# Patient Record
Sex: MALE | Race: WHITE | NOT HISPANIC OR LATINO | Employment: PART TIME | URBAN - METROPOLITAN AREA
[De-identification: names, ages, dates, MRNs, and addresses within clinical notes are randomized per-mention and may not be internally consistent; named-entity substitution may affect disease eponyms.]

---

## 2020-11-20 ENCOUNTER — APPOINTMENT (OUTPATIENT)
Dept: CT IMAGING | Facility: CLINIC | Age: 60
DRG: 247 | End: 2020-11-20
Attending: EMERGENCY MEDICINE

## 2020-11-20 ENCOUNTER — HOSPITAL ENCOUNTER (INPATIENT)
Facility: CLINIC | Age: 60
LOS: 2 days | Discharge: HOME | DRG: 247 | End: 2020-11-22
Attending: EMERGENCY MEDICINE | Admitting: INTERNAL MEDICINE

## 2020-11-20 DIAGNOSIS — I21.4 NSTEMI (NON-ST ELEVATED MYOCARDIAL INFARCTION) (H): ICD-10-CM

## 2020-11-20 LAB
ANION GAP SERPL CALCULATED.3IONS-SCNC: 4 MMOL/L (ref 3–14)
APTT PPP: 29 SEC (ref 22–37)
BASOPHILS # BLD AUTO: 0 10E9/L (ref 0–0.2)
BASOPHILS NFR BLD AUTO: 0.1 %
BUN SERPL-MCNC: 14 MG/DL (ref 7–30)
CALCIUM SERPL-MCNC: 8.6 MG/DL (ref 8.5–10.1)
CHLORIDE SERPL-SCNC: 105 MMOL/L (ref 94–109)
CO2 SERPL-SCNC: 27 MMOL/L (ref 20–32)
CREAT SERPL-MCNC: 1.2 MG/DL (ref 0.66–1.25)
CRP SERPL-MCNC: 81.7 MG/L (ref 0–8)
D DIMER PPP FEU-MCNC: 0.7 UG/ML FEU (ref 0–0.5)
DIFFERENTIAL METHOD BLD: ABNORMAL
EOSINOPHIL # BLD AUTO: 0.1 10E9/L (ref 0–0.7)
EOSINOPHIL NFR BLD AUTO: 0.4 %
ERYTHROCYTE [DISTWIDTH] IN BLOOD BY AUTOMATED COUNT: 13.9 % (ref 10–15)
GFR SERPL CREATININE-BSD FRML MDRD: 64 ML/MIN/{1.73_M2}
GLUCOSE SERPL-MCNC: 99 MG/DL (ref 70–99)
HBA1C MFR BLD: 6.2 % (ref 0–5.6)
HCT VFR BLD AUTO: 49.5 % (ref 40–53)
HGB BLD-MCNC: 17.1 G/DL (ref 13.3–17.7)
IMM GRANULOCYTES # BLD: 0 10E9/L (ref 0–0.4)
IMM GRANULOCYTES NFR BLD: 0.3 %
INR PPP: 0.96 (ref 0.86–1.14)
INTERPRETATION ECG - MUSE: NORMAL
INTERPRETATION ECG - MUSE: NORMAL
LYMPHOCYTES # BLD AUTO: 1.8 10E9/L (ref 0.8–5.3)
LYMPHOCYTES NFR BLD AUTO: 13.4 %
MCH RBC QN AUTO: 31.3 PG (ref 26.5–33)
MCHC RBC AUTO-ENTMCNC: 34.5 G/DL (ref 31.5–36.5)
MCV RBC AUTO: 91 FL (ref 78–100)
MONOCYTES # BLD AUTO: 1.6 10E9/L (ref 0–1.3)
MONOCYTES NFR BLD AUTO: 11.9 %
NEUTROPHILS # BLD AUTO: 9.9 10E9/L (ref 1.6–8.3)
NEUTROPHILS NFR BLD AUTO: 73.9 %
NRBC # BLD AUTO: 0 10*3/UL
NRBC BLD AUTO-RTO: 0 /100
NT-PROBNP SERPL-MCNC: 2175 PG/ML (ref 0–900)
PLATELET # BLD AUTO: 224 10E9/L (ref 150–450)
POTASSIUM SERPL-SCNC: 3.8 MMOL/L (ref 3.4–5.3)
RBC # BLD AUTO: 5.46 10E12/L (ref 4.4–5.9)
SARS-COV-2 RNA SPEC QL NAA+PROBE: NORMAL
SODIUM SERPL-SCNC: 136 MMOL/L (ref 133–144)
SPECIMEN SOURCE: NORMAL
TROPONIN I SERPL-MCNC: 29.68 UG/L (ref 0–0.04)
WBC # BLD AUTO: 13.4 10E9/L (ref 4–11)

## 2020-11-20 PROCEDURE — 96366 THER/PROPH/DIAG IV INF ADDON: CPT

## 2020-11-20 PROCEDURE — 99223 1ST HOSP IP/OBS HIGH 75: CPT | Mod: AI | Performed by: INTERNAL MEDICINE

## 2020-11-20 PROCEDURE — 99285 EMERGENCY DEPT VISIT HI MDM: CPT | Mod: 25

## 2020-11-20 PROCEDURE — 210N000002 HC R&B HEART CARE

## 2020-11-20 PROCEDURE — 86140 C-REACTIVE PROTEIN: CPT | Performed by: EMERGENCY MEDICINE

## 2020-11-20 PROCEDURE — 250N000013 HC RX MED GY IP 250 OP 250 PS 637: Performed by: EMERGENCY MEDICINE

## 2020-11-20 PROCEDURE — 83880 ASSAY OF NATRIURETIC PEPTIDE: CPT | Performed by: EMERGENCY MEDICINE

## 2020-11-20 PROCEDURE — 93005 ELECTROCARDIOGRAM TRACING: CPT

## 2020-11-20 PROCEDURE — 250N000011 HC RX IP 250 OP 636: Performed by: EMERGENCY MEDICINE

## 2020-11-20 PROCEDURE — C9803 HOPD COVID-19 SPEC COLLECT: HCPCS

## 2020-11-20 PROCEDURE — 96365 THER/PROPH/DIAG IV INF INIT: CPT | Mod: 59

## 2020-11-20 PROCEDURE — 96368 THER/DIAG CONCURRENT INF: CPT

## 2020-11-20 PROCEDURE — 84484 ASSAY OF TROPONIN QUANT: CPT | Performed by: EMERGENCY MEDICINE

## 2020-11-20 PROCEDURE — 93005 ELECTROCARDIOGRAM TRACING: CPT | Mod: 76

## 2020-11-20 PROCEDURE — 85610 PROTHROMBIN TIME: CPT | Performed by: EMERGENCY MEDICINE

## 2020-11-20 PROCEDURE — 85025 COMPLETE CBC W/AUTO DIFF WBC: CPT | Performed by: EMERGENCY MEDICINE

## 2020-11-20 PROCEDURE — 96375 TX/PRO/DX INJ NEW DRUG ADDON: CPT

## 2020-11-20 PROCEDURE — 85730 THROMBOPLASTIN TIME PARTIAL: CPT | Performed by: EMERGENCY MEDICINE

## 2020-11-20 PROCEDURE — 250N000009 HC RX 250: Performed by: EMERGENCY MEDICINE

## 2020-11-20 PROCEDURE — 83036 HEMOGLOBIN GLYCOSYLATED A1C: CPT | Performed by: EMERGENCY MEDICINE

## 2020-11-20 PROCEDURE — U0003 INFECTIOUS AGENT DETECTION BY NUCLEIC ACID (DNA OR RNA); SEVERE ACUTE RESPIRATORY SYNDROME CORONAVIRUS 2 (SARS-COV-2) (CORONAVIRUS DISEASE [COVID-19]), AMPLIFIED PROBE TECHNIQUE, MAKING USE OF HIGH THROUGHPUT TECHNOLOGIES AS DESCRIBED BY CMS-2020-01-R: HCPCS | Performed by: EMERGENCY MEDICINE

## 2020-11-20 PROCEDURE — 85379 FIBRIN DEGRADATION QUANT: CPT | Performed by: EMERGENCY MEDICINE

## 2020-11-20 PROCEDURE — 80048 BASIC METABOLIC PNL TOTAL CA: CPT | Performed by: EMERGENCY MEDICINE

## 2020-11-20 PROCEDURE — 71275 CT ANGIOGRAPHY CHEST: CPT

## 2020-11-20 RX ORDER — NITROGLYCERIN 20 MG/100ML
10-200 INJECTION INTRAVENOUS CONTINUOUS
Status: DISCONTINUED | OUTPATIENT
Start: 2020-11-21 | End: 2020-11-22

## 2020-11-20 RX ORDER — ACETAMINOPHEN 650 MG/1
650 SUPPOSITORY RECTAL EVERY 4 HOURS PRN
Status: DISCONTINUED | OUTPATIENT
Start: 2020-11-20 | End: 2020-11-22 | Stop reason: HOSPADM

## 2020-11-20 RX ORDER — ONDANSETRON 2 MG/ML
4 INJECTION INTRAMUSCULAR; INTRAVENOUS EVERY 6 HOURS PRN
Status: DISCONTINUED | OUTPATIENT
Start: 2020-11-20 | End: 2020-11-22 | Stop reason: HOSPADM

## 2020-11-20 RX ORDER — HYDRALAZINE HYDROCHLORIDE 20 MG/ML
10 INJECTION INTRAMUSCULAR; INTRAVENOUS EVERY 4 HOURS PRN
Status: DISCONTINUED | OUTPATIENT
Start: 2020-11-20 | End: 2020-11-22 | Stop reason: HOSPADM

## 2020-11-20 RX ORDER — ONDANSETRON 4 MG/1
4 TABLET, ORALLY DISINTEGRATING ORAL EVERY 6 HOURS PRN
Status: DISCONTINUED | OUTPATIENT
Start: 2020-11-20 | End: 2020-11-22 | Stop reason: HOSPADM

## 2020-11-20 RX ORDER — LABETALOL HYDROCHLORIDE 5 MG/ML
20 INJECTION, SOLUTION INTRAVENOUS ONCE
Status: COMPLETED | OUTPATIENT
Start: 2020-11-20 | End: 2020-11-20

## 2020-11-20 RX ORDER — CARVEDILOL 6.25 MG/1
6.25 TABLET ORAL 2 TIMES DAILY
Status: DISCONTINUED | OUTPATIENT
Start: 2020-11-21 | End: 2020-11-22

## 2020-11-20 RX ORDER — POLYETHYLENE GLYCOL 3350 17 G/17G
17 POWDER, FOR SOLUTION ORAL DAILY
Status: DISCONTINUED | OUTPATIENT
Start: 2020-11-21 | End: 2020-11-22 | Stop reason: HOSPADM

## 2020-11-20 RX ORDER — MORPHINE SULFATE 2 MG/ML
2 INJECTION, SOLUTION INTRAMUSCULAR; INTRAVENOUS
Status: DISCONTINUED | OUTPATIENT
Start: 2020-11-20 | End: 2020-11-22 | Stop reason: HOSPADM

## 2020-11-20 RX ORDER — ASPIRIN 81 MG/1
324 TABLET, CHEWABLE ORAL ONCE
Status: COMPLETED | OUTPATIENT
Start: 2020-11-20 | End: 2020-11-20

## 2020-11-20 RX ORDER — HEPARIN SODIUM 10000 [USP'U]/100ML
0-5000 INJECTION, SOLUTION INTRAVENOUS CONTINUOUS
Status: DISCONTINUED | OUTPATIENT
Start: 2020-11-20 | End: 2020-11-21

## 2020-11-20 RX ORDER — IOPAMIDOL 755 MG/ML
80 INJECTION, SOLUTION INTRAVASCULAR ONCE
Status: COMPLETED | OUTPATIENT
Start: 2020-11-20 | End: 2020-11-20

## 2020-11-20 RX ORDER — ATORVASTATIN CALCIUM 40 MG/1
40 TABLET, FILM COATED ORAL DAILY
Status: DISCONTINUED | OUTPATIENT
Start: 2020-11-21 | End: 2020-11-22 | Stop reason: HOSPADM

## 2020-11-20 RX ORDER — AMOXICILLIN 250 MG
2 CAPSULE ORAL 2 TIMES DAILY PRN
Status: DISCONTINUED | OUTPATIENT
Start: 2020-11-20 | End: 2020-11-22 | Stop reason: HOSPADM

## 2020-11-20 RX ORDER — LIDOCAINE 40 MG/G
CREAM TOPICAL
Status: DISCONTINUED | OUTPATIENT
Start: 2020-11-20 | End: 2020-11-22 | Stop reason: HOSPADM

## 2020-11-20 RX ORDER — POLYETHYLENE GLYCOL 3350 17 G/17G
17 POWDER, FOR SOLUTION ORAL DAILY PRN
Status: DISCONTINUED | OUTPATIENT
Start: 2020-11-20 | End: 2020-11-22 | Stop reason: HOSPADM

## 2020-11-20 RX ORDER — LISINOPRIL 10 MG/1
10 TABLET ORAL DAILY
Status: DISCONTINUED | OUTPATIENT
Start: 2020-11-21 | End: 2020-11-22 | Stop reason: HOSPADM

## 2020-11-20 RX ORDER — AMOXICILLIN 250 MG
2 CAPSULE ORAL 2 TIMES DAILY
Status: DISCONTINUED | OUTPATIENT
Start: 2020-11-21 | End: 2020-11-22 | Stop reason: HOSPADM

## 2020-11-20 RX ORDER — ACETAMINOPHEN 325 MG/1
650 TABLET ORAL EVERY 4 HOURS PRN
Status: DISCONTINUED | OUTPATIENT
Start: 2020-11-20 | End: 2020-11-22 | Stop reason: HOSPADM

## 2020-11-20 RX ORDER — AMOXICILLIN 250 MG
1 CAPSULE ORAL 2 TIMES DAILY
Status: DISCONTINUED | OUTPATIENT
Start: 2020-11-21 | End: 2020-11-22 | Stop reason: HOSPADM

## 2020-11-20 RX ORDER — HEPARIN SODIUM 10000 [USP'U]/100ML
0-5000 INJECTION, SOLUTION INTRAVENOUS CONTINUOUS
Status: DISCONTINUED | OUTPATIENT
Start: 2020-11-21 | End: 2020-11-22

## 2020-11-20 RX ORDER — NITROGLYCERIN 20 MG/100ML
10-200 INJECTION INTRAVENOUS CONTINUOUS
Status: DISCONTINUED | OUTPATIENT
Start: 2020-11-20 | End: 2020-11-21

## 2020-11-20 RX ORDER — AMOXICILLIN 250 MG
1 CAPSULE ORAL 2 TIMES DAILY PRN
Status: DISCONTINUED | OUTPATIENT
Start: 2020-11-20 | End: 2020-11-22 | Stop reason: HOSPADM

## 2020-11-20 RX ORDER — BISACODYL 10 MG
10 SUPPOSITORY, RECTAL RECTAL DAILY PRN
Status: DISCONTINUED | OUTPATIENT
Start: 2020-11-20 | End: 2020-11-22 | Stop reason: HOSPADM

## 2020-11-20 RX ORDER — ASPIRIN 81 MG/1
81 TABLET, CHEWABLE ORAL DAILY
Status: DISCONTINUED | OUTPATIENT
Start: 2020-11-21 | End: 2020-11-22

## 2020-11-20 RX ADMIN — LABETALOL HYDROCHLORIDE 20 MG: 5 INJECTION, SOLUTION INTRAVENOUS at 18:59

## 2020-11-20 RX ADMIN — SODIUM CHLORIDE 100 ML: 9 INJECTION, SOLUTION INTRAVENOUS at 19:24

## 2020-11-20 RX ADMIN — NITROGLYCERIN 10 MCG/MIN: 20 INJECTION INTRAVENOUS at 19:37

## 2020-11-20 RX ADMIN — HEPARIN SODIUM 1200 UNITS/HR: 10000 INJECTION, SOLUTION INTRAVENOUS at 19:37

## 2020-11-20 RX ADMIN — ASPIRIN 324 MG: 81 TABLET, CHEWABLE ORAL at 19:36

## 2020-11-20 RX ADMIN — IOPAMIDOL 80 ML: 755 INJECTION, SOLUTION INTRAVENOUS at 19:24

## 2020-11-20 RX ADMIN — TICAGRELOR 180 MG: 90 TABLET ORAL at 22:23

## 2020-11-20 ASSESSMENT — ENCOUNTER SYMPTOMS
FEVER: 0
CHEST TIGHTNESS: 1
HEADACHES: 0
DIZZINESS: 0
SHORTNESS OF BREATH: 1
COUGH: 1

## 2020-11-20 ASSESSMENT — MIFFLIN-ST. JEOR: SCORE: 2001.48

## 2020-11-20 NOTE — Clinical Note
Stent deployed in the distal right coronary artery. Max pressure = 8 ronan. Total duration = 20 seconds. Balloon reinflated a second time: Max pressure = 12 ronan. Total duration = 22 seconds.

## 2020-11-20 NOTE — Clinical Note
The first balloon was inserted into the right coronary artery and RPLB.Max pressure = 4 ronan. Total duration = 15 seconds.     Max pressure = 4 ronan. Total duration = 15 seconds.    Balloon reinflated a second time: Max pressure = 4 ronan. Total duration = 15 seconds.  Balloon reinflated a third time: Max pressure = 6 ronan. Total duration = 40 seconds.

## 2020-11-20 NOTE — Clinical Note
The first balloon was inserted into the right coronary artery and distal right coronary artery.Max pressure = 6 ronan. Total duration = 30 seconds.     Max pressure = 4 ronan. Total duration = 20 seconds.    Balloon reinflated a second time: Max pressure = 4 ronan. Total duration = 20 seconds.  Balloon reinflated a third time: Max pressure = 6 ronan. Total duration = 25 seconds.  Balloon reinflated a fourth time: Max pressure = 6 ronan. Total duration = 20 seconds.

## 2020-11-20 NOTE — ED PROVIDER NOTES
History     Chief Complaint:  Chest Tightness and Shortness of Breath     HPI   Sancho Hardin is a 60 year old male with a history of uncontrolled hypertension (not on meds) who presents for evaluation of chest tightness and shortness of breath. The patient reports that approximately four days ago he started to develop new constant chest heaviness and tightness that seems to be independent of position or activity. He denies chest pain and notes that his bilateral hands and ankles have also been abnormally swollen since then (his ankles have intermittently swelled in the past). He reports that his blood pressure is usually in the 160/120s range, but today it was much higher prompting him to call his clinic who advised him to seek evaluation in the ED. He also reports a somewhat productive cough and shortness of breath that is worse with activity and when bending over, although he notes that he has had both of these symptoms for a long time and they have not worsened recently. Otherwise, he denies any recent fever, headache, or dizziness.      Allergies:  Oxycodone-acetaminophen   Vicodin      Medications:    Lisinopril-hydrochlorothiazide (not taking for several years)     Past Medical History:    Hypertension   Hyperlipidemia   Migraines     Past Surgical History:    Irrigation and debridement, excisional biopsy of infected right long finger   Right hand third digit pin placement   Hemorrhoidectomy  Tonsillectomy and adenoidectomy     Family History:    Macular degeneration - Mother   Astigmatism - Sister      Social History:  Tobacco use:    Current every day smoker   Alcohol use:    Negative   Drug use:    Negative   Marital status:          Review of Systems   Constitutional: Negative for fever.   Respiratory: Positive for cough, chest tightness and shortness of breath.    Cardiovascular: Positive for leg swelling (bilateral ankles). Negative for chest pain.   Musculoskeletal:        (+) Bilateral  "hand swelling    Neurological: Negative for dizziness and headaches.   All other systems reviewed and are negative.      Physical Exam     Patient Vitals for the past 24 hrs:   BP Temp Temp src Pulse Resp SpO2 Height Weight   11/20/20 2014 (!) 142/96 -- -- 85 18 94 % -- --   11/20/20 2000 (!) 146/99 -- -- 82 20 93 % -- --   11/20/20 1945 (!) 150/120 -- -- 89 14 94 % -- --   11/20/20 1930 (!) 151/113 -- -- 81 29 96 % -- --   11/20/20 1915 (!) 167/123 -- -- 79 29 95 % -- --   11/20/20 1900 (!) 183/117 -- -- 93 20 95 % -- --   11/20/20 1733 (!) 210/140 97.7  F (36.5  C) Temporal 98 24 97 % 1.803 m (5' 11\") 117.9 kg (260 lb)      Physical Exam  General/Appearance: appears stated age, well-groomed, appears comfortable but slightly winded when talking, elevated BMI  Eyes: EOMI, no scleral injection, no icterus  ENT: MMM  Neck: supple, nl ROM, no stiffness  Cardiovascular: RRR, nl S1S2, no m/r/g, 2+ pulses in all 4 extremities, cap refill <2sec  Respiratory: CTAB, good air movement throughout, no wheezes/rhonchi/rales, slight tachypnea, no retractions  Back: no lesions  GI: abd soft and obese, non-distended, nttp,  no HSM, no rebound, no guarding, nl BS  MSK: PORTILLO, good tone, no bony abnormality  Skin: warm and well-perfused, no rash, non-pitting edema to bilateral ankles and shins, no ecchymosis, nl turgor  Neuro: GCS 15, alert and oriented, no gross focal neuro deficits  Psych: interacts appropriately  Heme: no petechia, no purpura, no active bleeding    Emergency Department Course   ECG (17:34:19):  Indication: Screening for cardiovascular disease.   Rate 96 bpm. NM interval 152 ms. QRS duration 104 ms. QT/QTc 376/475 ms. P-R-T axes 40 -14 128.   Interpretation: Normal sinus rhythm, Nonspecific ST and T wave abnormality, Prolonged QT, Abnormal ECG   Agree with computer interpretation. Yes   No significant change compared to EKG dated 3/13/2014.   Interpreted at 1740 by Dr. Rai.      ECG (19:13:09):  Indication: " Screening for cardiovascular disease.   Rate 79 bpm. MD interval 160 ms. QRS duration 106 ms. QT/QTc 418/479 ms. P-R-T axes 29 -7 170.   Interpretation: Normal sinus rhythm, Inferior infarct age undetermined, ST & T wave abnormality consider lateral ischemia, Abnormal ECG   Agree with computer interpretation. Yes.   Interpreted at 1920 by Dr. Rai.        Imaging:  Radiographic findings were communicated with the patient who voiced understanding of the findings.    CT Chest Pulmonary Embolism w Contrast:  IMPRESSION:   1. No evidence of pulmonary embolism.   2. The main pulmonary artery is enlarged measuring 3.1 cm, this can be seen with pulmonary hypertension. Multiple enlarged mediastinal and hilar lymph nodes, increased as compared to 3/13/2014 exam, indeterminate, could be reactive.   3. Scattered pulmonary nodules including 10 mm right lower lobe nodule, increased in size as compared to 3/13/2014 where it measured 7 mm, although indeterminant, but less likely neoplastic given its very slow interval growth. Recommend follow-up exam in three months to ensure stability.   4. Mild cardiomegaly.   Per radiology.      Laboratory:  CBC: WBC 13.4 high, o/w WNL (HGB 17.1, )   BMP: WNL (Creatinine 1.20)    Troponin I (1816): 29.680 high   D dimer quantitative: 0.7 high   Nt probnp inpatient: 2,175 high   INR: 0.96   Partial thromboplastin time: 29   Symptomatic COVID-19 Virus by PCR: Pending      Interventions:  1859 Labetalol 20 mg IV   1936 Aspirin 324 mg PO   1937 Heparin 1,200 units/hr IV   1937 Nitroglycerin 10 mcg/min IV   1941 Heparin 6,000 units IV   2005 Nitroglycerin 50 mcg/min IV rate/dose change    Brilinta 180 mg IV     Emergency Department Course:  Nursing notes and vitals reviewed.  1755: I performed an exam of the patient as documented above.      1912: I updated and reassessed the patient. He reported ongoing constant chest pressure.     1936: I spoke with Dr. Ortiz of the cardiology service  regarding patient's presentation, findings, and plan of care.     1949: I spoke with Dr. Ortiz of the cardiology service regarding patient's presentation, findings, and plan of care.     1958: I spoke with Dr. Carrillo of the interventional cardiology service regarding patient's presentation, findings, and plan of care.     2003: I updated and reassessed the patient. He has not had any change in symptoms.      2040: I updated and reassessed the patient. He reports that he still feels short of breath but his chest pressure is gone.      2050: I spoke with Dr. Carrillo of the interventional cardiology service regarding patient's presentation, findings, and plan of care. He wants to hold off on the cath lab for now but start him on 180 mg of Bilinta and he will see the patient in the morning.      2121: I spoke with Dr. Recio of the hospitalist service regarding patient's presentation, findings, and plan of care.      Findings and plan explained to the Patient who consents to admission. Discussed the patient with Dr. Recio, who will admit the patient to a CCU bed for further monitoring, evaluation, and treatment.      Impression & Plan       Medical Decision Making:  This patient is a 60-year-old male with history of uncontrolled hypertension, not on medication, who presents with 4 days of chest tightness and shortness of breath.  He also notes edema to all 4 extremities.  Differential for him was broad and included ACS, PE, infectious source, heart failure exacerbation.  Ultimately  D-dimer did come back is slightly elevated so he was sent for CT which was relatively negative.  Most importantly an impressively his troponin came back is markedly elevated near 30.  His initial EKG was concerning with some nonspecific ST-T wave changes however serial EKGs showed some slight progression and elevation in the inferior leads and some progressive T wave inversions in the lateral leads.  It did not meet STEMI criteria but  definitely was a concerning pattern.  Because of this I spoke with both the on-call cardiologist and then ultimately the interventional cardiologist.  They agreed with the beta-blocker, labetalol, that have been given as well as the aspirin, nitro drip, heparin drip.  Per the interventional cardiologist we did wait to see how symptoms responded to the nitro drip.  The shortness of breath did not resolve but the chest heaviness did.  Because of this, as well as the timeframe for how long he had been symptomatic, it was felt that likely an emergent cath would not change his ultimate prognosis.  They requested that I give him Brilinta and that he come in and be seen by cardiology in the morning.  Patient is in agreement with this plan.  Critical Care time:  was 60 minutes for this patient excluding procedures.     Diagnosis:    ICD-10-CM    1. NSTEMI (non-ST elevated myocardial infarction) (H)  I21.4 Symptomatic COVID-19 Virus (Coronavirus) by PCR     INR     INR     Partial thromboplastin time     Partial thromboplastin time     CANCELED: INR     CANCELED: Partial thromboplastin time      Disposition:  Admitted to the CCU.         I, Aayush Cartagena, am serving as a scribe at 5:55 PM on 11/20/2020 to document services personally performed by Dr. Rai, based on my observations and the provider's statements to me.     [facility] EMERGENCY DEPT       Cecelia, Monique Acevedo MD  11/20/20 [id number]

## 2020-11-20 NOTE — Clinical Note
Stent deployed in the middle right coronary artery. Max pressure = 11 ronan. Total duration = 15 seconds.

## 2020-11-20 NOTE — Clinical Note
The first balloon was inserted into the right coronary artery and RPDA.Max pressure = 7 ronan. Total duration = 15 seconds.     Max pressure = 7 ronan. Total duration = 15 seconds.    Balloon reinflated a second time: Max pressure = 7 ronan. Total duration = 15 seconds.  Balloon reinflated a third time: Max pressure = 12 ronan. Total duration = 20 seconds.  Balloon reinflated a fourth time: Max pressure = 12 ronan. Total duration = 20 seconds.

## 2020-11-20 NOTE — Clinical Note
The first balloon was inserted into the right coronary artery and middle right coronary artery.Max pressure = 12 ronan. Total duration = 20 seconds.     Max pressure = 14 ronan. Total duration = 25 seconds.    Balloon reinflated a second time: Max pressure = 14 ronan. Total duration = 25 seconds.

## 2020-11-20 NOTE — ED TRIAGE NOTES
C/o constant bilateral chest pain below both breast radiating around into his back. Sob. Swollen feet yesterday. And a sore tooth X 2 days . States his BP is high.

## 2020-11-21 LAB
ALBUMIN SERPL-MCNC: 3 G/DL (ref 3.4–5)
ALP SERPL-CCNC: 112 U/L (ref 40–150)
ALT SERPL W P-5'-P-CCNC: 35 U/L (ref 0–70)
APTT PPP: 37 SEC (ref 22–37)
AST SERPL W P-5'-P-CCNC: 120 U/L (ref 0–45)
BILIRUB DIRECT SERPL-MCNC: 0.2 MG/DL (ref 0–0.2)
BILIRUB SERPL-MCNC: 1 MG/DL (ref 0.2–1.3)
CHOLEST SERPL-MCNC: 193 MG/DL
ERYTHROCYTE [DISTWIDTH] IN BLOOD BY AUTOMATED COUNT: 13.9 % (ref 10–15)
HCT VFR BLD AUTO: 44.2 % (ref 40–53)
HDLC SERPL-MCNC: 40 MG/DL
HGB BLD-MCNC: 15 G/DL (ref 13.3–17.7)
LABORATORY COMMENT REPORT: NORMAL
LDLC SERPL CALC-MCNC: 131 MG/DL
MCH RBC QN AUTO: 30.5 PG (ref 26.5–33)
MCHC RBC AUTO-ENTMCNC: 33.9 G/DL (ref 31.5–36.5)
MCV RBC AUTO: 90 FL (ref 78–100)
NONHDLC SERPL-MCNC: 153 MG/DL
PLATELET # BLD AUTO: 192 10E9/L (ref 150–450)
POTASSIUM SERPL-SCNC: 3.5 MMOL/L (ref 3.4–5.3)
PROT SERPL-MCNC: 6.5 G/DL (ref 6.8–8.8)
RBC # BLD AUTO: 4.92 10E12/L (ref 4.4–5.9)
SARS-COV-2 RNA SPEC QL NAA+PROBE: NEGATIVE
SPECIMEN SOURCE: NORMAL
TRIGL SERPL-MCNC: 110 MG/DL
TROPONIN I SERPL-MCNC: 26.06 UG/L (ref 0–0.04)
TROPONIN I SERPL-MCNC: 28.1 UG/L (ref 0–0.04)
UFH PPP CHRO-ACNC: 0.27 IU/ML
UFH PPP CHRO-ACNC: <0.1 IU/ML
WBC # BLD AUTO: 12 10E9/L (ref 4–11)

## 2020-11-21 PROCEDURE — 85520 HEPARIN ASSAY: CPT | Performed by: EMERGENCY MEDICINE

## 2020-11-21 PROCEDURE — 36415 COLL VENOUS BLD VENIPUNCTURE: CPT | Performed by: INTERNAL MEDICINE

## 2020-11-21 PROCEDURE — 250N000011 HC RX IP 250 OP 636: Performed by: INTERNAL MEDICINE

## 2020-11-21 PROCEDURE — 84484 ASSAY OF TROPONIN QUANT: CPT | Performed by: INTERNAL MEDICINE

## 2020-11-21 PROCEDURE — 80076 HEPATIC FUNCTION PANEL: CPT | Performed by: INTERNAL MEDICINE

## 2020-11-21 PROCEDURE — 85520 HEPARIN ASSAY: CPT | Performed by: INTERNAL MEDICINE

## 2020-11-21 PROCEDURE — 85027 COMPLETE CBC AUTOMATED: CPT | Performed by: INTERNAL MEDICINE

## 2020-11-21 PROCEDURE — 85730 THROMBOPLASTIN TIME PARTIAL: CPT | Performed by: EMERGENCY MEDICINE

## 2020-11-21 PROCEDURE — 210N000002 HC R&B HEART CARE

## 2020-11-21 PROCEDURE — 93005 ELECTROCARDIOGRAM TRACING: CPT

## 2020-11-21 PROCEDURE — 99221 1ST HOSP IP/OBS SF/LOW 40: CPT | Performed by: INTERNAL MEDICINE

## 2020-11-21 PROCEDURE — 36415 COLL VENOUS BLD VENIPUNCTURE: CPT | Performed by: EMERGENCY MEDICINE

## 2020-11-21 PROCEDURE — 99232 SBSQ HOSP IP/OBS MODERATE 35: CPT | Performed by: INTERNAL MEDICINE

## 2020-11-21 PROCEDURE — 84132 ASSAY OF SERUM POTASSIUM: CPT | Performed by: INTERNAL MEDICINE

## 2020-11-21 PROCEDURE — 250N000013 HC RX MED GY IP 250 OP 250 PS 637: Performed by: INTERNAL MEDICINE

## 2020-11-21 PROCEDURE — 93010 ELECTROCARDIOGRAM REPORT: CPT | Mod: 76 | Performed by: INTERNAL MEDICINE

## 2020-11-21 PROCEDURE — 80061 LIPID PANEL: CPT | Performed by: INTERNAL MEDICINE

## 2020-11-21 RX ORDER — NALOXONE HYDROCHLORIDE 0.4 MG/ML
0.2 INJECTION, SOLUTION INTRAMUSCULAR; INTRAVENOUS; SUBCUTANEOUS
Status: DISCONTINUED | OUTPATIENT
Start: 2020-11-21 | End: 2020-11-22 | Stop reason: HOSPADM

## 2020-11-21 RX ORDER — NALOXONE HYDROCHLORIDE 0.4 MG/ML
0.4 INJECTION, SOLUTION INTRAMUSCULAR; INTRAVENOUS; SUBCUTANEOUS
Status: DISCONTINUED | OUTPATIENT
Start: 2020-11-21 | End: 2020-11-22 | Stop reason: HOSPADM

## 2020-11-21 RX ORDER — MORPHINE SULFATE 4 MG/ML
3 INJECTION, SOLUTION INTRAMUSCULAR; INTRAVENOUS ONCE
Status: COMPLETED | OUTPATIENT
Start: 2020-11-22 | End: 2020-11-21

## 2020-11-21 RX ADMIN — MORPHINE SULFATE 3 MG: 4 INJECTION INTRAVENOUS at 23:39

## 2020-11-21 RX ADMIN — CARVEDILOL 6.25 MG: 6.25 TABLET, FILM COATED ORAL at 22:57

## 2020-11-21 RX ADMIN — CARVEDILOL 6.25 MG: 6.25 TABLET, FILM COATED ORAL at 00:22

## 2020-11-21 RX ADMIN — MORPHINE SULFATE 2 MG: 2 INJECTION, SOLUTION INTRAMUSCULAR; INTRAVENOUS at 00:23

## 2020-11-21 RX ADMIN — TICAGRELOR 90 MG: 90 TABLET ORAL at 22:57

## 2020-11-21 RX ADMIN — HEPARIN SODIUM 1500 UNITS/HR: 10000 INJECTION, SOLUTION INTRAVENOUS at 23:01

## 2020-11-21 RX ADMIN — ATORVASTATIN CALCIUM 40 MG: 40 TABLET, FILM COATED ORAL at 08:43

## 2020-11-21 RX ADMIN — LISINOPRIL 10 MG: 10 TABLET ORAL at 08:43

## 2020-11-21 RX ADMIN — CARVEDILOL 6.25 MG: 6.25 TABLET, FILM COATED ORAL at 08:43

## 2020-11-21 RX ADMIN — MORPHINE SULFATE 2 MG: 2 INJECTION, SOLUTION INTRAMUSCULAR; INTRAVENOUS at 21:52

## 2020-11-21 RX ADMIN — HEPARIN SODIUM 1500 UNITS/HR: 10000 INJECTION, SOLUTION INTRAVENOUS at 05:37

## 2020-11-21 RX ADMIN — ASPIRIN 81 MG: 81 TABLET, CHEWABLE ORAL at 08:43

## 2020-11-21 RX ADMIN — NITROGLYCERIN 70 MCG/MIN: 20 INJECTION INTRAVENOUS at 08:32

## 2020-11-21 RX ADMIN — ACETAMINOPHEN 650 MG: 325 TABLET, FILM COATED ORAL at 08:46

## 2020-11-21 RX ADMIN — TICAGRELOR 90 MG: 90 TABLET ORAL at 08:43

## 2020-11-21 RX ADMIN — NITROGLYCERIN 70 MCG/MIN: 20 INJECTION INTRAVENOUS at 23:08

## 2020-11-21 NOTE — PLAN OF CARE
Neuro: A/O x4  CV/Rhythm: SR  Resp/02: MUNOZ, on 2L NC  GI/Diet: cardiac  : Voids in bathroom with SBA  Skin/Incisions/Sites: some bruising, intact  Pulses/CMS: palpable  Edema: NA  Activity/Falls Risk: SBA  Lines/Drains/IVs: Hep 1500 unit(s)/hr, nitroglycerin  60 (18ml/hr)  Labs/BGM:   Test/Procedures: awaiting angio  VS/Pain: VSS, c/o of headache relieved with tylenol  DC Plan: pending cardiac workup, likely home  Other:  Will keep NPO at midnight for possible angio tomorrow.

## 2020-11-21 NOTE — PLAN OF CARE
Patient alert, SBA. Up to floor still c/o SOB and chest discomfort. Nitroglycerin drip at 50mcg/kg/min, now at 70, morphine and O2 applied at 4l n/c and is pain free and no SOB.Appears to have some sleep apnea at times. Heparin drip at 1500. Trop trended down to 28. BP much improved. NPO for cardiology consult today. Review POC with patient.

## 2020-11-21 NOTE — PROGRESS NOTES
Text paged Dr. Ortiz to see if pt should remain NPO for possible angio today.  Per Dr. Ortiz ok to eat, will make NPO at midnight.

## 2020-11-21 NOTE — PHARMACY-ADMISSION MEDICATION HISTORY
Pharmacy Medication History  Admission medication history interview status for the 11/20/2020  admission is complete. See EPIC admission navigator for prior to admission medications     Medication history sources: Patient  Location of interview: phone  Medication history source reliability: Good  Adherence assessment: n/a    Additional medication history information:   Patient reports no medications on a regular basis.     Medication reconciliation completed by provider prior to medication history? No    Time spent in this activity: 5 minutes     Prior to Admission medications    Not on File     Diane Tipton, PharmD  [phone]

## 2020-11-21 NOTE — H&P
[facility]  History and Physical  Hospitalist       Date of Admission:  11/20/2020    Assessment & Plan   Sancho Hardin is a very pleasant 60 year old male with uncontrolled hypertension, morbid obesity, dyslipidemia, tobacco abuse who was admitted on 11/20/2020 with chest pressure, shortness of breath and significantly elevated troponin with EKG changes consistent with NSTEMI.    NSTEMI  Poorly controlled hypertension  Cardiomegaly   Symptoms of intermittent chest pressure for the past 4 days, along with increased ankle swelling. Not on medication prior to admission for blood pressure which he knows has been elevated. Brilinta and aspirin given in ED.   -admit to inpatient  -serial troponins  -therapeutic heparin infusion  -nitroglycerin infusion  -Brilinta 180 mg x 1 given in ED  -cardiology consultation  -check FLP, LFTs, CRP, hemoglobin A1c  -NPO after midnight in case of angiogram tomorrow  -aspirin 81 mg daily  -brilinta 90 mg daily  -start lisinopril 10 mg daily  -start carvedilol 6.25 mg BID  -start atorvastatin 40 mg daily  -prn hydralazine 10 mg IV q 4 hours as needed for SBP>170, can uptitrate as needed.     Morbid obesity  BMI 36    Incidental finding of scattered pulmonary nodules  These were present in imaging obtained in 2014 and have had minimal increase in size so unlikely malignancy.   -outpatient follow up for repeat imaging in 3 months    Symptomatic Rule Out of COVID-19 infection  This patient was evaluated during a global COVID-19 pandemic, which necessitated consideration that the patient might be at risk for infection with the SARS-CoV-2 virus that causes COVID-19. Applicable protocols for evaluation were followed during the patient's care. Low suspicion for infection given alternate explanation for shortness of breath, chest pressure.   -follow-up COVID-19 PCR test result  -droplet, contact precautions    DVT prophylaxis: therapeutic heparin  Code Status:  "Full     Disposition: Expected discharge in 2 days.    Yara Recio MD  Text Page    ~~~~~~~~~~~~~~~~~~~~~~~~~~~~~~~~~~~~~~~~~~~~~~~~~~~~~  Primary Care Physician   CELIO BENAVIDEZ    Chief Complaint   Chest pressure, shortness of breath, leg swelling    History is obtained from the patient and medical records.    History of Present Illness   Sancho Hardin is a very pleasant 60 year old male with uncontrolled hypertension, dyslipidemia, morbid obesity, tobacco abuse who presents with about 4 days of intermittent chest pressure, central and radiating to bilateral pectoral muscles then to back. He acknowledges having untreated hypertension for \"quite a while\". Along with this he has had shortness of breath, foot and ankle swelling, as well as hand swelling. No immobility or calf tenderness. No pleuritic chest pain. His blood pressure on presentation was 210/140, then was 142/96 after getting labetalol x 1, and initiation of nitroglycerin infusion. No fevers, chills, night sweats, headache, vision changes. Reports less chest pressure sensation after the nitroglycerin was started but shortness of breath persists.  He is a current smoker.  No known family history of major cardiac problems that he can recall.    Case reviewed with Dr. Rai from the Emergency Department. Labs and available imaging reviewed. Pertinent results include: troponin 29, BNP 2,175, BMP normal, d.dimer 0.7. 12 lead SKG with lateral t wave inversions. Chest CT negative for PE but has scattered pulmonary nodules, cardiomegaly. The patient was given aspirin, brilinta, initated on heparin infusion, nitroglycerin infusion, labetalol x 1. Spoke with the cardiologist and interventional cardiologist on call who did not feel the patient would need urgent angiogram tonight and could be seen tomorrow morning.     Past Medical History    I have reviewed this patient's medical history and updated it with pertinent information if needed.   Morbid " obesity  Hypertension  High cholesterol  Tobacco abuse    Past Surgical History   I have reviewed this patient's surgical history and updated it with pertinent information if needed.  Past Surgical History:   Procedure Laterality Date     AMPUTATE FINGER(S)  9/27/2013    Procedure: AMPUTATE FINGER(S);  IRRIGATION AND DEBRIDEMENT,excisional biopsy of infection RIGHT LONG FINGER.;  Surgeon: Demond Woodward MD;  Location:  OR     ORTHOPEDIC SURGERY  09/04/2013    right hand 3rd digit pin placement     Prior to Admission Medications   None     Allergies   Allergies   Allergen Reactions     Oxycodone-Acetaminophen Nausea and Vomiting     Vicodin [Hydrocodone-Acetaminophen]        Social History   I have reviewed this patient's social history and updated it with pertinent information if needed. Sancho Hardin  reports that he has been smoking. He does not have any smokeless tobacco history on file. He reports that he does not drink alcohol or use drugs.    Family History   I have reviewed this patient's family history and updated it with pertinent information if needed. No known family history of major cardiac problems that he can recall.    Review of Systems   The 10 point Review of Systems is negative other than noted in the HPI or here.    Physical Exam   Temp: 97.7  F (36.5  C) Temp src: Temporal BP: (!) 142/96 Pulse: 85   Resp: 18 SpO2: 94 % O2 Device: None (Room air)    Vital Signs with Ranges  Temp:  [97.7  F (36.5  C)] 97.7  F (36.5  C)  Pulse:  [79-98] 85  Resp:  [14-29] 18  BP: (142-210)/() 142/96  SpO2:  [93 %-97 %] 94 %  260 lbs 0 oz    Constitutional: Alert, NAD, pleasant and interactive  Eyes: PERRL, sclerae anicteric  HEENT: mmm, atraumatic  Respiratory: Lungs CTAB, no wheezes or crackles  No chest wall tenderness to palpation  Cardiovascular: RRR, no murmurs  no LE edema  GI: Obese, soft, non-tender, nondistended  Skin/Integument: warm, dry, no acute rashes  Genitourinary: not  examined  Musc: PORTILLO, normal limb strength x 4  Neuro: AOx3, no focal deficits, no tremors  Psych: not anxious, not confused      Data   Data reviewed today:  I personally reviewed: 12 lead SKG with lateral t wave inversions. Chest CT negative for PE but has scattered pulmonary nodules, cardiomegaly.  Recent Labs   Lab 11/20/20  1816   WBC 13.4*   HGB 17.1   MCV 91      INR 0.96      POTASSIUM 3.8   CHLORIDE 105   CO2 27   BUN 14   CR 1.20   ANIONGAP 4   ARMANDO 8.6   GLC 99   TROPI 29.680*       Imaging:  Recent Results (from the past 24 hour(s))   CT Chest Pulmonary Embolism w Contrast    Narrative    CT CHEST PULMONARY EMBOLISM WITH CONTRAST  11/20/2020 7:37 PM    HISTORY: PE suspected, intermediate probability, positive D-dimer.  Chest pain. Evaluate PE.    TECHNIQUE: Scans obtained from the apices through the diaphragm with  IV contrast. 80mL Isovue-370 IV injected. Radiation dose for this scan  was reduced using automated exposure control, adjustment of the mA  and/or kV according to patient size, or iterative reconstruction  technique.    COMPARISON: Chest CT on 3/13/2014.    FINDINGS:  Chest/mediastinum: No evidence of pulmonary embolism. Mild  cardiomegaly. No pericardial effusion. Moderate atherosclerotic  vascular calcification of the coronary arteries and thoracic aorta.  Multiple enlarged mediastinal and hilar lymph nodes, increased as  compared to 3/13/2014 exam, for example, 1.5 cm short axis right  pretracheal lymph node (series 5 image 93) and 1.2 cm short axis right  hilar lymph node (series 5 image 118), indeterminate, reactive. The  main pulmonary artery is enlarged measuring 3.6 cm, this can be seen  with pulmonary hypertension.    Lungs and pleura: No pleural effusion or pneumothorax. Mild bibasilar  atelectasis. Scattered pulmonary nodules including 10 mm right lower  lobe nodule (series 5 image 141), slightly increased as compared to  3/13/2014 exam which measures 7 mm and 6 mm left  lower lobe nodule  (series 5 image 174), not significantly changed as compared to  3/13/2014 exam.    Upper abdomen: Limited evaluation of the upper abdomen due to lack of  coverage and timing of contrast. Reflux of contrast into the hepatic  IVC, suggesting right heart dysfunction.    Bones and soft tissue: Multilevel degenerative changes of the spine.  No suspicious osseous lesion.      Impression    IMPRESSION:   1. No evidence of pulmonary embolism.  2. The main pulmonary artery is enlarged measuring 3.1 cm, this can be  seen with pulmonary hypertension. Multiple enlarged mediastinal and  hilar lymph nodes, increased as compared to 3/13/2014 exam,  indeterminate, could be reactive.  3. Scattered pulmonary nodules including 10 mm right lower lobe  nodule, increased in size as compared to 3/13/2014 where it measured 7  mm, although indeterminant, but less likely neoplastic given its very  slow interval growth. Recommend follow-up exam in three months to  ensure stability.  4. Mild cardiomegaly.    NATHAN WILL MD

## 2020-11-21 NOTE — PROGRESS NOTES
[facility]  Hospitalist Progress Note  Vincenzo Huang MD  11/21/2020    Assessment & Plan   Sancho Hardin is a very pleasant 60 year old male with uncontrolled hypertension, morbid obesity, dyslipidemia, tobacco abuse who was admitted on 11/20/2020 with chest pressure, shortness of breath and significantly elevated troponin with EKG changes consistent with NSTEMI.     NSTEMI  Poorly controlled hypertension  Cardiomegaly   Symptoms of intermittent chest pressure for the past 4 days, along with increased ankle swelling. Not on medication prior to admission for blood pressure which he knows has been elevated. Brilinta and aspirin given in ED.   -serial troponin downtrending  -therapeutic heparin infusion  -nitroglycerin infusion  -cardiology consultation  - FLP pending, LFTs pending CRP elevated to 81 hemoglobin A1c is 6.2  -NPO after midnight in case of angiogram tomorrow  -aspirin 81 mg daily  -brilinta 90 mg daily  -continue lisinopril 10 mg daily, carvedilol 6.25 mg BID and lipitor 40 mg  -prn hydralazine 10 mg IV q 4 hours as needed for SBP>170, can uptitrate as needed.      Morbid obesity  BMI 36     Incidental finding of scattered pulmonary nodules  These were present in imaging obtained in 2014 and have had minimal increase in size so unlikely malignancy.   -outpatient follow up for repeat imaging in 3 months     Symptomatic Rule Out of COVID-19 infection  This patient was evaluated during a global COVID-19 pandemic, which necessitated consideration that the patient might be at risk for infection with the SARS-CoV-2 virus that causes COVID-19. Applicable protocols for evaluation were followed during the patient's care. Low suspicion for infection given alternate explanation for shortness of breath, chest pressure.   -follow-up COVID-19 PCR test negative     DVT prophylaxis: therapeutic heparin    Code Status: Full     Disposition: Expected discharge in 2 days.    Interval  "History   -- chart reviewed  -- appreciate cards consult  -- troponin downtrending, chest pain and pressure gone, slight pressure    -Data reviewed today: I reviewed all new labs and imaging over the last 24 hours. I personally reviewed no images or EKG's today.    Physical Exam    , Blood pressure 120/78, pulse 72, temperature 97.9  F (36.6  C), temperature source Oral, resp. rate 23, height 1.803 m (5' 11\"), weight 117.9 kg (260 lb), SpO2 95 %.  Vitals:    11/20/20 1733   Weight: 117.9 kg (260 lb)     Vital Signs with Ranges  Temp:  [97.7  F (36.5  C)-99.2  F (37.3  C)] 97.9  F (36.6  C)  Pulse:  [72-98] 72  Resp:  [13-36] 23  BP: (115-210)/() 120/78  SpO2:  [91 %-97 %] 95 %  I/O's Last 24 hours  I/O last 3 completed shifts:  In: 490 [P.O.:200; I.V.:290]  Out: -     Constitutional: Awake, alert, cooperative, no apparent distress  Respiratory: Clear to auscultation bilaterally, no crackles or wheezing  Cardiovascular: Regular rate and rhythm, normal S1 and S2, and no murmur noted  GI: Normal bowel sounds, soft, non-distended, non-tender  Skin/Integumen: No rashes, no cyanosis, no edema  Other:      Medications   All medications were reviewed.    - MEDICATION INSTRUCTIONS -       heparin 1,500 Units/hr (11/21/20 0831)     - MEDICATION INSTRUCTIONS -       nitroGLYcerin 60 mcg/min (11/21/20 0850)     nitroGLYcerin 50 mg in D5W 250 mL         aspirin  81 mg Oral Daily     atorvastatin  40 mg Oral Daily     carvedilol  6.25 mg Oral BID     [START ON 11/22/2020] influenza recomb quadrivalent PF  0.5 mL Intramuscular Prior to discharge     lisinopril  10 mg Oral Daily     polyethylene glycol  17 g Oral Daily     senna-docusate  1 tablet Oral BID    Or     senna-docusate  2 tablet Oral BID     sodium chloride (PF)  3 mL Intracatheter Q8H     ticagrelor  90 mg Oral BID        Data   Recent Labs   Lab 11/21/20  0541 11/21/20  0121 11/20/20  1816   WBC  --  12.0* 13.4*   HGB  --  15.0 17.1   MCV  --  90 91   PLT  --  " 192 224   INR  --   --  0.96   NA  --   --  136   POTASSIUM  --  3.5 3.8   CHLORIDE  --   --  105   CO2  --   --  27   BUN  --   --  14   CR  --   --  1.20   ANIONGAP  --   --  4   ARMANDO  --   --  8.6   GLC  --   --  99   ALBUMIN  --  3.0*  --    PROTTOTAL  --  6.5*  --    BILITOTAL  --  1.0  --    ALKPHOS  --  112  --    ALT  --  35  --    AST  --  120*  --    TROPI 26.059* 28.104* 29.680*       Recent Results (from the past 24 hour(s))   CT Chest Pulmonary Embolism w Contrast    Narrative    CT CHEST PULMONARY EMBOLISM WITH CONTRAST  11/20/2020 7:37 PM    HISTORY: PE suspected, intermediate probability, positive D-dimer.  Chest pain. Evaluate PE.    TECHNIQUE: Scans obtained from the apices through the diaphragm with  IV contrast. 80mL Isovue-370 IV injected. Radiation dose for this scan  was reduced using automated exposure control, adjustment of the mA  and/or kV according to patient size, or iterative reconstruction  technique.    COMPARISON: Chest CT on 3/13/2014.    FINDINGS:  Chest/mediastinum: No evidence of pulmonary embolism. Mild  cardiomegaly. No pericardial effusion. Moderate atherosclerotic  vascular calcification of the coronary arteries and thoracic aorta.  Multiple enlarged mediastinal and hilar lymph nodes, increased as  compared to 3/13/2014 exam, for example, 1.5 cm short axis right  pretracheal lymph node (series 5 image 93) and 1.2 cm short axis right  hilar lymph node (series 5 image 118), indeterminate, reactive. The  main pulmonary artery is enlarged measuring 3.6 cm, this can be seen  with pulmonary hypertension.    Lungs and pleura: No pleural effusion or pneumothorax. Mild bibasilar  atelectasis. Scattered pulmonary nodules including 10 mm right lower  lobe nodule (series 5 image 141), slightly increased as compared to  3/13/2014 exam which measures 7 mm and 6 mm left lower lobe nodule  (series 5 image 174), not significantly changed as compared to  3/13/2014 exam.    Upper abdomen:  Limited evaluation of the upper abdomen due to lack of  coverage and timing of contrast. Reflux of contrast into the hepatic  IVC, suggesting right heart dysfunction.    Bones and soft tissue: Multilevel degenerative changes of the spine.  No suspicious osseous lesion.      Impression    IMPRESSION:   1. No evidence of pulmonary embolism.  2. The main pulmonary artery is enlarged measuring 3.1 cm, this can be  seen with pulmonary hypertension. Multiple enlarged mediastinal and  hilar lymph nodes, increased as compared to 3/13/2014 exam,  indeterminate, could be reactive.  3. Scattered pulmonary nodules including 10 mm right lower lobe  nodule, increased in size as compared to 3/13/2014 where it measured 7  mm, although indeterminant, but less likely neoplastic given its very  slow interval growth. Recommend follow-up exam in three months to  ensure stability.  4. Mild cardiomegaly.    MD Vincenzo ROJAS MD  Text Page  (7am to 6pm)

## 2020-11-21 NOTE — ED NOTES
DATE:  11/20/2020   TIME OF RECEIPT FROM LAB:  1903  LAB TEST:  Troponin  LAB VALUE:  29.68  RESULTS GIVEN WITH READ-BACK TO (PROVIDER):  Monique Rai*  TIME LAB VALUE REPORTED TO PROVIDER:   1904

## 2020-11-21 NOTE — PLAN OF CARE
OT/CR: orders rec'd, pt admitted due to chest pain and found to have NSTEMI. Per chart, pt will need an angio and are awaiting COVID test results prior to angio. Will hold OT/CR until after angio and/or POC established.

## 2020-11-21 NOTE — CONSULTS
[facility]    Cardiology Consultation     Date of Admission:  11/20/2020    Assessment & Plan   Sancho Hardin is a 60 year old male who was admitted on 11/20/2020. I was asked to see the patient for a non-ST elevation myocardial infarction.  The patient had a delayed presentation and has had chest pain for 4 days.  He was hypertensive and in the emergency room after receiving nitroglycerin his blood pressure improved and his chest pain resolved.  He is resting comfortably and sleeping this morning.    Troponin is downtrending with heparin, Brilinta, nitroglycerin.  We are awaiting a Covid test.  Once the Covid test has resulted we will proceed with a coronary angiogram plus or minus PCI.    Last night, I was called by the emergency department in regards to the patient.  The patient had slight ST elevation in the inferior leads and I recommended that the emergency room doctor speak with the interventional cardiologist on-call.  Dr. Carrillo reviewed the case and recommended loading the patient with Brilinta and controlling the blood pressure.  Emergent angiogram was deferred by the interventionalist for now.    #1 NSTEMI  #2 Hypertension  #3 obesity    Plan:  Continue aspirin, statin, Brilinta, heparin infusion  Await COVID-19 testing, discussed proceeding to Cath Lab once this test result has returned  Obtain echocardiogram  No need for further troponins unless there is a clinical change  Continue nitroglycerin for chest pain and blood pressure control for now  Okay to continue carvedilol and lisinopril      Sean Ortiz MD     Code Status    Full Code    Reason for Consult   Reason for consult: NSTEMI    Primary Care Physician   CELIO BENAVIDEZ    Chief Complaint   Chest pain    History is obtained from the patient    History of Present Illness   Sancho Hardin is a 60 year old male who presents with a 4-day history of intermittent chest pain.  The chest pain radiated to  the patient's anterior bilateral chest with some radiation to the back.  The patient has had a longstanding history of untreated hypertension.    The patient developed pain as well as shortness of breath over the last 4 days.  He was seen in the emergency department.  Initial troponin was 29.  Initial EKG noted slight ST elevation in the inferior leads.  Cardiology was called in the emergency department.  I spoke with the ER physician.  Given the initial troponin elevation and slight ST elevation I recommended discussions with the interventional cardiologist.  Dr. Carrillo was conferenced and recommended medical management at this time.  The patient received heparin infusion, Brilinta, aspirin, nitroglycerin.  The patient's chest pain resolved once his blood pressure was under control.    Chest CT scan was negative for pulmonary embolism but had scattered pulmonary nodules.    Troponin is downtrending.  Echocardiogram is pending.  Patient is currently resting comfortably in the bed.    Past Medical History   I have reviewed this patient's medical history and updated it with pertinent information if needed.   No past medical history on file.    Past Surgical History   I have reviewed this patient's surgical history and updated it with pertinent information if needed.  Past Surgical History:   Procedure Laterality Date     AMPUTATE FINGER(S)  9/27/2013    Procedure: AMPUTATE FINGER(S);  IRRIGATION AND DEBRIDEMENT,excisional biopsy of infection RIGHT LONG FINGER.;  Surgeon: Demond Woodward MD;  Location:  OR     ORTHOPEDIC SURGERY  09/04/2013    right hand 3rd digit pin placement       Prior to Admission Medications   None     Allergies   Allergies   Allergen Reactions     Oxycodone-Acetaminophen Nausea and Vomiting     Vicodin [Hydrocodone-Acetaminophen]        Social History   I have reviewed this patient's social history and updated it with pertinent information if needed. Sancho Hardin  reports that he  has been smoking. He does not have any smokeless tobacco history on file. He reports that he does not drink alcohol or use drugs.    Family History   I have reviewed this patient's family history and updated it with pertinent information if needed.   No family history on file.    Review of Systems   The 12 point Review of Systems is negative other than noted in the HPI or here.     Physical Exam   Temp: 99.2  F (37.3  C) Temp src: Oral BP: (!) 132/93 Pulse: 84   Resp: 22 SpO2: 92 % O2 Device: Nasal cannula Oxygen Delivery: 4 LPM  Vital Signs with Ranges  Temp:  [97.7  F (36.5  C)-99.2  F (37.3  C)] 99.2  F (37.3  C)  Pulse:  [77-98] 84  Resp:  [13-36] 22  BP: (115-210)/() 132/93  SpO2:  [91 %-97 %] 92 %  260 lbs 0 oz    GENERAL APPEARANCE: Alert and oriented x3. No acute distress.  SKIN: Inspection of the skin reveals no rashes, ulcerations, warm, dry  NECK: Supple and symmetric.   Normal JVP  LUNGS: Clear breath sounds throughout bilaterally, no wheezes, no rhonchi  CARDIOVASCULAR: S1, S2, regular rate and rhythm without any murmurs, gallops, rubs. The carotid pulses were normal and 2+ bilaterally without bruits. Peripheral pulses were 2+ and symmetric.  ABDOMEN: Soft, non-tender, non-distended with normal bowel sounds.  No ascites noted.  EXTREMITIES: No edema.  NEUROLOGIC:  Normal mood and affect.  Sensation to touch was normal.      Data   Results for orders placed or performed during the hospital encounter of 11/20/20 (from the past 24 hour(s))   EKG 12 lead   Result Value Ref Range    Interpretation ECG Click View Image link to view waveform and result    CBC with platelets differential   Result Value Ref Range    WBC 13.4 (H) 4.0 - 11.0 10e9/L    RBC Count 5.46 4.4 - 5.9 10e12/L    Hemoglobin 17.1 13.3 - 17.7 g/dL    Hematocrit 49.5 40.0 - 53.0 %    MCV 91 78 - 100 fl    MCH 31.3 26.5 - 33.0 pg    MCHC 34.5 31.5 - 36.5 g/dL    RDW 13.9 10.0 - 15.0 %    Platelet Count 224 150 - 450 10e9/L    Diff Method  Automated Method     % Neutrophils 73.9 %    % Lymphocytes 13.4 %    % Monocytes 11.9 %    % Eosinophils 0.4 %    % Basophils 0.1 %    % Immature Granulocytes 0.3 %    Nucleated RBCs 0 0 /100    Absolute Neutrophil 9.9 (H) 1.6 - 8.3 10e9/L    Absolute Lymphocytes 1.8 0.8 - 5.3 10e9/L    Absolute Monocytes 1.6 (H) 0.0 - 1.3 10e9/L    Absolute Eosinophils 0.1 0.0 - 0.7 10e9/L    Absolute Basophils 0.0 0.0 - 0.2 10e9/L    Abs Immature Granulocytes 0.0 0 - 0.4 10e9/L    Absolute Nucleated RBC 0.0    Basic metabolic panel   Result Value Ref Range    Sodium 136 133 - 144 mmol/L    Potassium 3.8 3.4 - 5.3 mmol/L    Chloride 105 94 - 109 mmol/L    Carbon Dioxide 27 20 - 32 mmol/L    Anion Gap 4 3 - 14 mmol/L    Glucose 99 70 - 99 mg/dL    Urea Nitrogen 14 7 - 30 mg/dL    Creatinine 1.20 0.66 - 1.25 mg/dL    GFR Estimate 64 >60 mL/min/[1.73_m2]    GFR Estimate If Black 75 >60 mL/min/[1.73_m2]    Calcium 8.6 8.5 - 10.1 mg/dL   Troponin I   Result Value Ref Range    Troponin I ES 29.680 (HH) 0.000 - 0.045 ug/L   D dimer quantitative   Result Value Ref Range    D Dimer 0.7 (H) 0.0 - 0.50 ug/ml FEU   Nt probnp inpatient   Result Value Ref Range    N-Terminal Pro BNP Inpatient 2,175 (H) 0 - 900 pg/mL   INR   Result Value Ref Range    INR 0.96 0.86 - 1.14   Partial thromboplastin time   Result Value Ref Range    PTT 29 22 - 37 sec   CRP inflammation   Result Value Ref Range    CRP Inflammation 81.7 (H) 0.0 - 8.0 mg/L   Hemoglobin A1c   Result Value Ref Range    Hemoglobin A1C 6.2 (H) 0 - 5.6 %   EKG 12-lead, tracing only   Result Value Ref Range    Interpretation ECG Click View Image link to view waveform and result    CT Chest Pulmonary Embolism w Contrast    Narrative    CT CHEST PULMONARY EMBOLISM WITH CONTRAST  11/20/2020 7:37 PM    HISTORY: PE suspected, intermediate probability, positive D-dimer.  Chest pain. Evaluate PE.    TECHNIQUE: Scans obtained from the apices through the diaphragm with  IV contrast. 80mL Isovue-370 IV  injected. Radiation dose for this scan  was reduced using automated exposure control, adjustment of the mA  and/or kV according to patient size, or iterative reconstruction  technique.    COMPARISON: Chest CT on 3/13/2014.    FINDINGS:  Chest/mediastinum: No evidence of pulmonary embolism. Mild  cardiomegaly. No pericardial effusion. Moderate atherosclerotic  vascular calcification of the coronary arteries and thoracic aorta.  Multiple enlarged mediastinal and hilar lymph nodes, increased as  compared to 3/13/2014 exam, for example, 1.5 cm short axis right  pretracheal lymph node (series 5 image 93) and 1.2 cm short axis right  hilar lymph node (series 5 image 118), indeterminate, reactive. The  main pulmonary artery is enlarged measuring 3.6 cm, this can be seen  with pulmonary hypertension.    Lungs and pleura: No pleural effusion or pneumothorax. Mild bibasilar  atelectasis. Scattered pulmonary nodules including 10 mm right lower  lobe nodule (series 5 image 141), slightly increased as compared to  3/13/2014 exam which measures 7 mm and 6 mm left lower lobe nodule  (series 5 image 174), not significantly changed as compared to  3/13/2014 exam.    Upper abdomen: Limited evaluation of the upper abdomen due to lack of  coverage and timing of contrast. Reflux of contrast into the hepatic  IVC, suggesting right heart dysfunction.    Bones and soft tissue: Multilevel degenerative changes of the spine.  No suspicious osseous lesion.      Impression    IMPRESSION:   1. No evidence of pulmonary embolism.  2. The main pulmonary artery is enlarged measuring 3.1 cm, this can be  seen with pulmonary hypertension. Multiple enlarged mediastinal and  hilar lymph nodes, increased as compared to 3/13/2014 exam,  indeterminate, could be reactive.  3. Scattered pulmonary nodules including 10 mm right lower lobe  nodule, increased in size as compared to 3/13/2014 where it measured 7  mm, although indeterminant, but less likely  neoplastic given its very  slow interval growth. Recommend follow-up exam in three months to  ensure stability.  4. Mild cardiomegaly.    NATHAN WILL MD   Symptomatic COVID-19 Virus (Coronavirus) by PCR    Specimen: Nasopharyngeal   Result Value Ref Range    COVID-19 Virus PCR to [facility] - Source Nasopharyngeal     COVID-19 Virus PCR to [facility] - Result       Test received-See reflex to IDDL test SARS CoV2 (COVID-19) Virus RT-PCR   EKG 12-lead, tracing only   Result Value Ref Range    Interpretation ECG Click View Image link to view waveform and result    Troponin I - Now then in 4 hours x 2   Result Value Ref Range    Troponin I ES 28.104 (HH) 0.000 - 0.045 ug/L   Lipid panel reflex to direct LDL   Result Value Ref Range    Cholesterol 193 <200 mg/dL    Triglycerides 110 <150 mg/dL    HDL Cholesterol 40 >39 mg/dL    LDL Cholesterol Calculated 131 (H) <100 mg/dL    Non HDL Cholesterol 153 (H) <130 mg/dL   CBC with platelets   Result Value Ref Range    WBC 12.0 (H) 4.0 - 11.0 10e9/L    RBC Count 4.92 4.4 - 5.9 10e12/L    Hemoglobin 15.0 13.3 - 17.7 g/dL    Hematocrit 44.2 40.0 - 53.0 %    MCV 90 78 - 100 fl    MCH 30.5 26.5 - 33.0 pg    MCHC 33.9 31.5 - 36.5 g/dL    RDW 13.9 10.0 - 15.0 %    Platelet Count 192 150 - 450 10e9/L   Potassium   Result Value Ref Range    Potassium 3.5 3.4 - 5.3 mmol/L   Hepatic panel   Result Value Ref Range    Bilirubin Direct 0.2 0.0 - 0.2 mg/dL    Bilirubin Total 1.0 0.2 - 1.3 mg/dL    Albumin 3.0 (L) 3.4 - 5.0 g/dL    Protein Total 6.5 (L) 6.8 - 8.8 g/dL    Alkaline Phosphatase 112 40 - 150 U/L    ALT 35 0 - 70 U/L     (H) 0 - 45 U/L   Heparin Unfractionated Anti Xa Level   Result Value Ref Range    Heparin Unfractionated Anti Xa Level <0.10 IU/mL   Partial thromboplastin time   Result Value Ref Range    PTT 37 22 - 37 sec   Troponin I - Now then in 4 hours x 2   Result Value Ref Range    Troponin I ES 26.059 () 0.000 - 0.045 ug/L

## 2020-11-22 ENCOUNTER — APPOINTMENT (OUTPATIENT)
Dept: OCCUPATIONAL THERAPY | Facility: CLINIC | Age: 60
DRG: 247 | End: 2020-11-22

## 2020-11-22 ENCOUNTER — APPOINTMENT (OUTPATIENT)
Dept: OCCUPATIONAL THERAPY | Facility: CLINIC | Age: 60
DRG: 247 | End: 2020-11-22
Attending: INTERNAL MEDICINE

## 2020-11-22 ENCOUNTER — APPOINTMENT (OUTPATIENT)
Dept: CARDIOLOGY | Facility: CLINIC | Age: 60
DRG: 247 | End: 2020-11-22
Attending: INTERNAL MEDICINE

## 2020-11-22 VITALS
HEIGHT: 71 IN | WEIGHT: 257.5 LBS | RESPIRATION RATE: 16 BRPM | TEMPERATURE: 99.5 F | BODY MASS INDEX: 36.05 KG/M2 | SYSTOLIC BLOOD PRESSURE: 157 MMHG | OXYGEN SATURATION: 95 % | HEART RATE: 87 BPM | DIASTOLIC BLOOD PRESSURE: 107 MMHG

## 2020-11-22 LAB
ALBUMIN SERPL-MCNC: 2.8 G/DL (ref 3.4–5)
ALP SERPL-CCNC: 100 U/L (ref 40–150)
ALT SERPL W P-5'-P-CCNC: 28 U/L (ref 0–70)
ANION GAP SERPL CALCULATED.3IONS-SCNC: 5 MMOL/L (ref 3–14)
AST SERPL W P-5'-P-CCNC: 63 U/L (ref 0–45)
BILIRUB DIRECT SERPL-MCNC: 0.4 MG/DL (ref 0–0.2)
BILIRUB SERPL-MCNC: 1.1 MG/DL (ref 0.2–1.3)
BUN SERPL-MCNC: 18 MG/DL (ref 7–30)
CALCIUM SERPL-MCNC: 8.1 MG/DL (ref 8.5–10.1)
CHLORIDE SERPL-SCNC: 103 MMOL/L (ref 94–109)
CHOLEST SERPL-MCNC: 171 MG/DL
CO2 SERPL-SCNC: 25 MMOL/L (ref 20–32)
CREAT SERPL-MCNC: 1.2 MG/DL (ref 0.66–1.25)
ERYTHROCYTE [DISTWIDTH] IN BLOOD BY AUTOMATED COUNT: 13.9 % (ref 10–15)
GFR SERPL CREATININE-BSD FRML MDRD: 64 ML/MIN/{1.73_M2}
GLUCOSE SERPL-MCNC: 90 MG/DL (ref 70–99)
HCT VFR BLD AUTO: 42.4 % (ref 40–53)
HDLC SERPL-MCNC: 39 MG/DL
HGB BLD-MCNC: 14.4 G/DL (ref 13.3–17.7)
KCT BLD-ACNC: 209 SEC (ref 75–150)
KCT BLD-ACNC: 310 SEC (ref 75–150)
LDLC SERPL CALC-MCNC: 110 MG/DL
MCH RBC QN AUTO: 30.8 PG (ref 26.5–33)
MCHC RBC AUTO-ENTMCNC: 34 G/DL (ref 31.5–36.5)
MCV RBC AUTO: 91 FL (ref 78–100)
NONHDLC SERPL-MCNC: 132 MG/DL
PLATELET # BLD AUTO: 206 10E9/L (ref 150–450)
POTASSIUM SERPL-SCNC: 3.8 MMOL/L (ref 3.4–5.3)
PROT SERPL-MCNC: 6.7 G/DL (ref 6.8–8.8)
RBC # BLD AUTO: 4.68 10E12/L (ref 4.4–5.9)
SODIUM SERPL-SCNC: 133 MMOL/L (ref 133–144)
TRIGL SERPL-MCNC: 108 MG/DL
WBC # BLD AUTO: 12.5 10E9/L (ref 4–11)

## 2020-11-22 PROCEDURE — 4A023N7 MEASUREMENT OF CARDIAC SAMPLING AND PRESSURE, LEFT HEART, PERCUTANEOUS APPROACH: ICD-10-PCS | Performed by: INTERNAL MEDICINE

## 2020-11-22 PROCEDURE — 99152 MOD SED SAME PHYS/QHP 5/>YRS: CPT | Performed by: INTERNAL MEDICINE

## 2020-11-22 PROCEDURE — 93306 TTE W/DOPPLER COMPLETE: CPT | Mod: 26 | Performed by: INTERNAL MEDICINE

## 2020-11-22 PROCEDURE — 250N000011 HC RX IP 250 OP 636: Performed by: INTERNAL MEDICINE

## 2020-11-22 PROCEDURE — C1874 STENT, COATED/COV W/DEL SYS: HCPCS | Performed by: INTERNAL MEDICINE

## 2020-11-22 PROCEDURE — 80048 BASIC METABOLIC PNL TOTAL CA: CPT | Performed by: INTERNAL MEDICINE

## 2020-11-22 PROCEDURE — C1887 CATHETER, GUIDING: HCPCS | Performed by: INTERNAL MEDICINE

## 2020-11-22 PROCEDURE — C1769 GUIDE WIRE: HCPCS | Performed by: INTERNAL MEDICINE

## 2020-11-22 PROCEDURE — 92929 PR PRQ TRLUML CORONARY BM STENT W/ANGIO ADDL ART/BRNCH: CPT | Mod: RC | Performed by: INTERNAL MEDICINE

## 2020-11-22 PROCEDURE — 97165 OT EVAL LOW COMPLEX 30 MIN: CPT | Mod: GO | Performed by: OCCUPATIONAL THERAPIST

## 2020-11-22 PROCEDURE — 99233 SBSQ HOSP IP/OBS HIGH 50: CPT | Mod: 25 | Performed by: INTERNAL MEDICINE

## 2020-11-22 PROCEDURE — B2111ZZ FLUOROSCOPY OF MULTIPLE CORONARY ARTERIES USING LOW OSMOLAR CONTRAST: ICD-10-PCS | Performed by: INTERNAL MEDICINE

## 2020-11-22 PROCEDURE — 250N000013 HC RX MED GY IP 250 OP 250 PS 637: Performed by: INTERNAL MEDICINE

## 2020-11-22 PROCEDURE — 99152 MOD SED SAME PHYS/QHP 5/>YRS: CPT | Mod: 59 | Performed by: INTERNAL MEDICINE

## 2020-11-22 PROCEDURE — 93005 ELECTROCARDIOGRAM TRACING: CPT

## 2020-11-22 PROCEDURE — 255N000002 HC RX 255 OP 636: Performed by: INTERNAL MEDICINE

## 2020-11-22 PROCEDURE — 36415 COLL VENOUS BLD VENIPUNCTURE: CPT | Performed by: INTERNAL MEDICINE

## 2020-11-22 PROCEDURE — 99239 HOSP IP/OBS DSCHRG MGMT >30: CPT | Performed by: INTERNAL MEDICINE

## 2020-11-22 PROCEDURE — 93454 CORONARY ARTERY ANGIO S&I: CPT | Performed by: INTERNAL MEDICINE

## 2020-11-22 PROCEDURE — 99153 MOD SED SAME PHYS/QHP EA: CPT | Performed by: INTERNAL MEDICINE

## 2020-11-22 PROCEDURE — C1894 INTRO/SHEATH, NON-LASER: HCPCS | Performed by: INTERNAL MEDICINE

## 2020-11-22 PROCEDURE — 93454 CORONARY ARTERY ANGIO S&I: CPT | Mod: 26 | Performed by: INTERNAL MEDICINE

## 2020-11-22 PROCEDURE — 85347 COAGULATION TIME ACTIVATED: CPT

## 2020-11-22 PROCEDURE — 250N000009 HC RX 250: Performed by: INTERNAL MEDICINE

## 2020-11-22 PROCEDURE — 92928 PRQ TCAT PLMT NTRAC ST 1 LES: CPT | Mod: RC | Performed by: INTERNAL MEDICINE

## 2020-11-22 PROCEDURE — 80076 HEPATIC FUNCTION PANEL: CPT | Performed by: INTERNAL MEDICINE

## 2020-11-22 PROCEDURE — 97110 THERAPEUTIC EXERCISES: CPT | Mod: GO | Performed by: OCCUPATIONAL THERAPIST

## 2020-11-22 PROCEDURE — 93010 ELECTROCARDIOGRAM REPORT: CPT | Performed by: INTERNAL MEDICINE

## 2020-11-22 PROCEDURE — 85027 COMPLETE CBC AUTOMATED: CPT | Performed by: INTERNAL MEDICINE

## 2020-11-22 PROCEDURE — 272N000001 HC OR GENERAL SUPPLY STERILE: Performed by: INTERNAL MEDICINE

## 2020-11-22 PROCEDURE — 84132 ASSAY OF SERUM POTASSIUM: CPT | Performed by: INTERNAL MEDICINE

## 2020-11-22 PROCEDURE — 80061 LIPID PANEL: CPT | Performed by: INTERNAL MEDICINE

## 2020-11-22 PROCEDURE — 99207 PR NO CHARGE LOS: CPT | Performed by: NURSE PRACTITIONER

## 2020-11-22 PROCEDURE — 027135Z DILATION OF CORONARY ARTERY, TWO ARTERIES WITH TWO DRUG-ELUTING INTRALUMINAL DEVICES, PERCUTANEOUS APPROACH: ICD-10-PCS | Performed by: INTERNAL MEDICINE

## 2020-11-22 PROCEDURE — C1725 CATH, TRANSLUMIN NON-LASER: HCPCS | Performed by: INTERNAL MEDICINE

## 2020-11-22 PROCEDURE — 999N000208 ECHOCARDIOGRAM COMPLETE

## 2020-11-22 PROCEDURE — C9600 PERC DRUG-EL COR STENT SING: HCPCS | Mod: RC | Performed by: INTERNAL MEDICINE

## 2020-11-22 PROCEDURE — C9601 PERC DRUG-EL COR STENT BRAN: HCPCS | Mod: RC

## 2020-11-22 DEVICE — STENT SYNERGY DRUG ELUTING 3.50X16MM  H7493926016350: Type: IMPLANTABLE DEVICE | Status: FUNCTIONAL

## 2020-11-22 DEVICE — STENT SYNERGY DRUG ELUTING 2.25X38MM  H7493926038220: Type: IMPLANTABLE DEVICE | Status: FUNCTIONAL

## 2020-11-22 RX ORDER — SODIUM CHLORIDE 9 MG/ML
INJECTION, SOLUTION INTRAVENOUS CONTINUOUS
Status: DISCONTINUED | OUTPATIENT
Start: 2020-11-22 | End: 2020-11-22 | Stop reason: HOSPADM

## 2020-11-22 RX ORDER — ONDANSETRON 4 MG/1
4 TABLET, ORALLY DISINTEGRATING ORAL EVERY 6 HOURS PRN
Status: DISCONTINUED | OUTPATIENT
Start: 2020-11-22 | End: 2020-11-22

## 2020-11-22 RX ORDER — LORAZEPAM 2 MG/ML
0.5 INJECTION INTRAMUSCULAR
Status: DISCONTINUED | OUTPATIENT
Start: 2020-11-22 | End: 2020-11-22 | Stop reason: HOSPADM

## 2020-11-22 RX ORDER — METOPROLOL TARTRATE 1 MG/ML
2.5 INJECTION, SOLUTION INTRAVENOUS ONCE
Status: COMPLETED | OUTPATIENT
Start: 2020-11-22 | End: 2020-11-22

## 2020-11-22 RX ORDER — LORAZEPAM 0.5 MG/1
0.5 TABLET ORAL
Status: DISCONTINUED | OUTPATIENT
Start: 2020-11-22 | End: 2020-11-22 | Stop reason: HOSPADM

## 2020-11-22 RX ORDER — FENTANYL CITRATE 50 UG/ML
INJECTION, SOLUTION INTRAMUSCULAR; INTRAVENOUS
Status: DISCONTINUED | OUTPATIENT
Start: 2020-11-22 | End: 2020-11-22 | Stop reason: HOSPADM

## 2020-11-22 RX ORDER — IOPAMIDOL 755 MG/ML
INJECTION, SOLUTION INTRAVASCULAR
Status: DISCONTINUED | OUTPATIENT
Start: 2020-11-22 | End: 2020-11-22 | Stop reason: HOSPADM

## 2020-11-22 RX ORDER — NITROGLYCERIN 5 MG/ML
VIAL (ML) INTRAVENOUS
Status: DISCONTINUED | OUTPATIENT
Start: 2020-11-22 | End: 2020-11-22 | Stop reason: HOSPADM

## 2020-11-22 RX ORDER — CARVEDILOL 12.5 MG/1
12.5 TABLET ORAL 2 TIMES DAILY WITH MEALS
Status: DISCONTINUED | OUTPATIENT
Start: 2020-11-22 | End: 2020-11-22 | Stop reason: HOSPADM

## 2020-11-22 RX ORDER — HYDRALAZINE HYDROCHLORIDE 20 MG/ML
10 INJECTION INTRAMUSCULAR; INTRAVENOUS EVERY 4 HOURS PRN
Status: DISCONTINUED | OUTPATIENT
Start: 2020-11-22 | End: 2020-11-22

## 2020-11-22 RX ORDER — NALOXONE HYDROCHLORIDE 0.4 MG/ML
.2-.4 INJECTION, SOLUTION INTRAMUSCULAR; INTRAVENOUS; SUBCUTANEOUS
Status: DISCONTINUED | OUTPATIENT
Start: 2020-11-22 | End: 2020-11-22

## 2020-11-22 RX ORDER — SODIUM CHLORIDE 9 MG/ML
INJECTION, SOLUTION INTRAVENOUS CONTINUOUS
Status: ACTIVE | OUTPATIENT
Start: 2020-11-22 | End: 2020-11-22

## 2020-11-22 RX ORDER — CARVEDILOL 12.5 MG/1
12.5 TABLET ORAL 2 TIMES DAILY WITH MEALS
Qty: 60 TABLET | Refills: 1 | Status: SHIPPED | OUTPATIENT
Start: 2020-11-22 | End: 2022-08-24

## 2020-11-22 RX ORDER — CLOPIDOGREL BISULFATE 75 MG/1
75 TABLET ORAL ONCE
Status: DISCONTINUED | OUTPATIENT
Start: 2020-11-22 | End: 2020-11-22

## 2020-11-22 RX ORDER — LIDOCAINE 40 MG/G
CREAM TOPICAL
Status: DISCONTINUED | OUTPATIENT
Start: 2020-11-22 | End: 2020-11-22

## 2020-11-22 RX ORDER — ASPIRIN 81 MG/1
81 TABLET ORAL DAILY
Status: DISCONTINUED | OUTPATIENT
Start: 2020-11-23 | End: 2020-11-22 | Stop reason: HOSPADM

## 2020-11-22 RX ORDER — POTASSIUM CHLORIDE 1500 MG/1
20 TABLET, EXTENDED RELEASE ORAL
Status: DISCONTINUED | OUTPATIENT
Start: 2020-11-22 | End: 2020-11-22 | Stop reason: HOSPADM

## 2020-11-22 RX ORDER — CLOPIDOGREL 300 MG/1
600 TABLET, FILM COATED ORAL ONCE
Status: COMPLETED | OUTPATIENT
Start: 2020-11-22 | End: 2020-11-22

## 2020-11-22 RX ORDER — NITROGLYCERIN 0.4 MG/1
TABLET SUBLINGUAL
Qty: 30 TABLET | Refills: 1 | Status: SHIPPED | OUTPATIENT
Start: 2020-11-22

## 2020-11-22 RX ORDER — CLOPIDOGREL BISULFATE 75 MG/1
75 TABLET ORAL DAILY
Qty: 30 TABLET | Refills: 1 | Status: SHIPPED | OUTPATIENT
Start: 2020-11-23

## 2020-11-22 RX ORDER — METOPROLOL TARTRATE 1 MG/ML
5-10 INJECTION, SOLUTION INTRAVENOUS
Status: DISCONTINUED | OUTPATIENT
Start: 2020-11-22 | End: 2020-11-22 | Stop reason: HOSPADM

## 2020-11-22 RX ORDER — FENTANYL CITRATE 50 UG/ML
25-50 INJECTION, SOLUTION INTRAMUSCULAR; INTRAVENOUS
Status: ACTIVE | OUTPATIENT
Start: 2020-11-22 | End: 2020-11-22

## 2020-11-22 RX ORDER — HEPARIN SODIUM 1000 [USP'U]/ML
INJECTION, SOLUTION INTRAVENOUS; SUBCUTANEOUS
Status: DISCONTINUED | OUTPATIENT
Start: 2020-11-22 | End: 2020-11-22 | Stop reason: HOSPADM

## 2020-11-22 RX ORDER — FLUMAZENIL 0.1 MG/ML
0.2 INJECTION, SOLUTION INTRAVENOUS
Status: ACTIVE | OUTPATIENT
Start: 2020-11-22 | End: 2020-11-22

## 2020-11-22 RX ORDER — CLOPIDOGREL BISULFATE 75 MG/1
75 TABLET ORAL DAILY
Status: DISCONTINUED | OUTPATIENT
Start: 2020-11-23 | End: 2020-11-22 | Stop reason: HOSPADM

## 2020-11-22 RX ORDER — VERAPAMIL HYDROCHLORIDE 2.5 MG/ML
INJECTION, SOLUTION INTRAVENOUS
Status: DISCONTINUED | OUTPATIENT
Start: 2020-11-22 | End: 2020-11-22 | Stop reason: HOSPADM

## 2020-11-22 RX ORDER — ATROPINE SULFATE 0.1 MG/ML
0.5 INJECTION INTRAVENOUS
Status: ACTIVE | OUTPATIENT
Start: 2020-11-22 | End: 2020-11-22

## 2020-11-22 RX ORDER — ATORVASTATIN CALCIUM 40 MG/1
40 TABLET, FILM COATED ORAL DAILY
Qty: 30 TABLET | Refills: 1 | Status: SHIPPED | OUTPATIENT
Start: 2020-11-23 | End: 2022-08-24

## 2020-11-22 RX ORDER — ASPIRIN 81 MG/1
81 TABLET, CHEWABLE ORAL ONCE
Status: COMPLETED | OUTPATIENT
Start: 2020-11-22 | End: 2020-11-22

## 2020-11-22 RX ORDER — NITROGLYCERIN 0.4 MG/1
0.4 TABLET SUBLINGUAL EVERY 5 MIN PRN
Status: DISCONTINUED | OUTPATIENT
Start: 2020-11-22 | End: 2020-11-22 | Stop reason: HOSPADM

## 2020-11-22 RX ORDER — ONDANSETRON 2 MG/ML
4 INJECTION INTRAMUSCULAR; INTRAVENOUS EVERY 6 HOURS PRN
Status: DISCONTINUED | OUTPATIENT
Start: 2020-11-22 | End: 2020-11-22

## 2020-11-22 RX ORDER — LISINOPRIL 10 MG/1
20 TABLET ORAL DAILY
Qty: 60 TABLET | Refills: 1 | Status: SHIPPED | OUTPATIENT
Start: 2020-11-23 | End: 2022-08-24

## 2020-11-22 RX ADMIN — ASPIRIN 81 MG: 81 TABLET, CHEWABLE ORAL at 08:51

## 2020-11-22 RX ADMIN — CARVEDILOL 6.25 MG: 6.25 TABLET, FILM COATED ORAL at 08:35

## 2020-11-22 RX ADMIN — TICAGRELOR 90 MG: 90 TABLET ORAL at 08:35

## 2020-11-22 RX ADMIN — HUMAN ALBUMIN MICROSPHERES AND PERFLUTREN 9 ML: 10; .22 INJECTION, SOLUTION INTRAVENOUS at 13:00

## 2020-11-22 RX ADMIN — CLOPIDOGREL BISULFATE 600 MG: 300 TABLET, FILM COATED ORAL at 14:33

## 2020-11-22 RX ADMIN — METOPROLOL TARTRATE 2.5 MG: 5 INJECTION INTRAVENOUS at 01:18

## 2020-11-22 RX ADMIN — ATORVASTATIN CALCIUM 40 MG: 40 TABLET, FILM COATED ORAL at 08:35

## 2020-11-22 RX ADMIN — LISINOPRIL 10 MG: 10 TABLET ORAL at 08:35

## 2020-11-22 ASSESSMENT — ACTIVITIES OF DAILY LIVING (ADL)
DRESSING/BATHING_DIFFICULTY: NO
WALKING_OR_CLIMBING_STAIRS_DIFFICULTY: NO
DIFFICULTY_COMMUNICATING: NO
TOILETING_ISSUES: NO
DIFFICULTY_EATING/SWALLOWING: NO
FALL_HISTORY_WITHIN_LAST_SIX_MONTHS: NO
PREVIOUS_RESPONSIBILITIES: MEAL PREP;HOUSEKEEPING;LAUNDRY;SHOPPING;YARDWORK;MEDICATION MANAGEMENT;FINANCES;DRIVING;WORK
CONCENTRATING,_REMEMBERING_OR_MAKING_DECISIONS_DIFFICULTY: NO
WEAR_GLASSES_OR_BLIND: NO
DOING_ERRANDS_INDEPENDENTLY_DIFFICULTY: NO

## 2020-11-22 ASSESSMENT — MIFFLIN-ST. JEOR: SCORE: 1990.13

## 2020-11-22 NOTE — CONSULTS
"Standard post-angio consult received for heart healthy diet education  Spoke with pt this morning - \"I am going home today!\"  Reports good appetite  Verbally reviewed basics of heart healthy eating guidelines  Written handout provided  "

## 2020-11-22 NOTE — DISCHARGE SUMMARY
[facility]  Discharge Summary        Sancho Hardin MRN# [id number]   YOB: 1960 Age: 60 year old     Date of Admission:  11/20/2020  Date of Discharge:  11/22/2020  Admitting Physician:  Yara Recio MD  Discharge Physician: Vincenzo Huang MD  Discharging Service: Hospitalist     Primary Provider:  Andrew Quinn  Primary Care Physician Phone Number: [phone]         Discharge Diagnoses/Problem Oriented Hospital Course (Providers):    Sancho Hardin was admitted on 11/20/2020 by Yara Recio MD and I would refer you to their history and physical.  The following problems were addressed during his hospitalization:    Sancho Hardin is a very pleasant 60 year old male with uncontrolled hypertension, morbid obesity, dyslipidemia, tobacco abuse who was admitted on 11/20/2020 with chest pressure, shortness of breath and significantly elevated troponin with EKG changes consistent with NSTEMI.     NSTEMI with mid and distal RCA thrombosis s/p stenting x2,  of mid cx  Poorly controlled hypertension  Cardiomegaly   Symptoms of intermittent chest pressure for the past 4 days, along with increased ankle swelling. Not on medication prior to admission for blood pressure which he knows has been elevated. Brilinta and aspirin given in ED.   -serial troponin downtrending  -treated with heparin infusion and nitroglycerin  -cardiology consultation  - FLP showed , HDL 39, , N  and   - LFTs normal CRP elevated to 81 hemoglobin A1c is 6.2  - underwent emergent LCH and underwent stenting of PDA and mid RCA, has  of mid circ  -aspirin 81 and plavix for 12 months  -continue lisinopril 20 mg daily, carvedilol 12.5 mg BID and lipitor 40 mg  -check ambulator bp readings and bring to cardiology clinic.  - outpatient cardiac rehab  - outpatient sleep study      Morbid obesity  BMI 36     Incidental finding of scattered pulmonary  nodules  These were present in imaging obtained in 2014 and have had minimal increase in size so unlikely malignancy.   -outpatient follow up for repeat imaging in 3 months     Symptomatic Rule Out of COVID-19 infection  This patient was evaluated during a global COVID-19 pandemic, which necessitated consideration that the patient might be at risk for infection with the SARS-CoV-2 virus that causes COVID-19. Applicable protocols for evaluation were followed during the patient's care. Low suspicion for infection given alternate explanation for shortness of breath, chest pressure.   -follow-up COVID-19 PCR test negative         Code Status:      Full Code         Important Results:      NAD  HEENT NORMAL  PULM CTA  CV RRR  GI SOFT +BS  MS NO EDEMA  NEURO NON FOCAL  DERM WARM AND DRY         Pending Results:        Unresulted Labs Ordered in the Past 30 Days of this Admission     No orders found from 10/21/2020 to 11/21/2020.               Discharge Instructions and Follow-Up:      Follow-up Appointments     Follow-up and recommended labs and tests       Follow up with cardiology in 10 days    Follow up with PCP in 2 weeks    Follow up with outpatient sleep study         Follow-up and recommended labs and tests       Repeat CT of chest for follow up pulmonary nodules in 3 months to   establish stability                  Discharge Disposition:      Discharged to home         Discharge Medications:        Current Discharge Medication List      START taking these medications    Details   aspirin (ASA) 81 MG EC tablet Take 1 tablet (81 mg) by mouth daily  Qty: 30 tablet, Refills: 1    Associated Diagnoses: NSTEMI (non-ST elevated myocardial infarction) (H)      atorvastatin (LIPITOR) 40 MG tablet Take 1 tablet (40 mg) by mouth daily  Qty: 30 tablet, Refills: 1    Associated Diagnoses: NSTEMI (non-ST elevated myocardial infarction) (H)      carvedilol (COREG) 12.5 MG tablet Take 1 tablet (12.5 mg) by mouth 2 times daily  (with meals)  Qty: 60 tablet, Refills: 1    Associated Diagnoses: NSTEMI (non-ST elevated myocardial infarction) (H)      clopidogrel (PLAVIX) 75 MG tablet Take 1 tablet (75 mg) by mouth daily  Qty: 30 tablet, Refills: 1    Associated Diagnoses: NSTEMI (non-ST elevated myocardial infarction) (H)      lisinopril (ZESTRIL) 10 MG tablet Take 2 tablets (20 mg) by mouth daily  Qty: 60 tablet, Refills: 1    Associated Diagnoses: NSTEMI (non-ST elevated myocardial infarction) (H)                  Allergies:         Allergies   Allergen Reactions     Oxycodone-Acetaminophen Nausea and Vomiting     Vicodin [Hydrocodone-Acetaminophen]             Consultations This Hospital Stay:      Consultation during this admission received from cardiology          Discharge Orders for Skilled Facility (from Discharge Orders):        After Care Instructions     Activity      Your activity upon discharge: activity as tolerated         Diet      Follow this diet upon discharge: Orders Placed This Encounter      Low Saturated Fat Na <2400 mg                    Rehab orders for Skilled Facility (from Discharge Orders):      Referrals     Future Labs/Procedures    CARDIAC REHAB REFERRAL     Process Instructions:    Advance to Wellness and Exercise for Life (WEL) Program or to another   maintenance exercise program upon completion of phase 2 cardiac rehab.    Weight mgt program is only offered at [facility].    *This therapy referral will be filtered to a centralized scheduling office   at [facility] and the patient will receive a call to   schedule an appointment at a [facility] location most convenient for them. *        Comments:    Patient may choose their preference of the site for Cardiac Rehab.               Discharge Time:       Greater than 30 minutes.        Image Results From This Hospital Stay (For Non-EPIC Providers):        Results for orders placed or performed during the hospital encounter of 11/20/20   CT Chest  Pulmonary Embolism w Contrast    Narrative    CT CHEST PULMONARY EMBOLISM WITH CONTRAST  11/20/2020 7:37 PM    HISTORY: PE suspected, intermediate probability, positive D-dimer.  Chest pain. Evaluate PE.    TECHNIQUE: Scans obtained from the apices through the diaphragm with  IV contrast. 80mL Isovue-370 IV injected. Radiation dose for this scan  was reduced using automated exposure control, adjustment of the mA  and/or kV according to patient size, or iterative reconstruction  technique.    COMPARISON: Chest CT on 3/13/2014.    FINDINGS:  Chest/mediastinum: No evidence of pulmonary embolism. Mild  cardiomegaly. No pericardial effusion. Moderate atherosclerotic  vascular calcification of the coronary arteries and thoracic aorta.  Multiple enlarged mediastinal and hilar lymph nodes, increased as  compared to 3/13/2014 exam, for example, 1.5 cm short axis right  pretracheal lymph node (series 5 image 93) and 1.2 cm short axis right  hilar lymph node (series 5 image 118), indeterminate, reactive. The  main pulmonary artery is enlarged measuring 3.6 cm, this can be seen  with pulmonary hypertension.    Lungs and pleura: No pleural effusion or pneumothorax. Mild bibasilar  atelectasis. Scattered pulmonary nodules including 10 mm right lower  lobe nodule (series 5 image 141), slightly increased as compared to  3/13/2014 exam which measures 7 mm and 6 mm left lower lobe nodule  (series 5 image 174), not significantly changed as compared to  3/13/2014 exam.    Upper abdomen: Limited evaluation of the upper abdomen due to lack of  coverage and timing of contrast. Reflux of contrast into the hepatic  IVC, suggesting right heart dysfunction.    Bones and soft tissue: Multilevel degenerative changes of the spine.  No suspicious osseous lesion.      Impression    IMPRESSION:   1. No evidence of pulmonary embolism.  2. The main pulmonary artery is enlarged measuring 3.1 cm, this can be  seen with pulmonary hypertension. Multiple  enlarged mediastinal and  hilar lymph nodes, increased as compared to 3/13/2014 exam,  indeterminate, could be reactive.  3. Scattered pulmonary nodules including 10 mm right lower lobe  nodule, increased in size as compared to 3/13/2014 where it measured 7  mm, although indeterminant, but less likely neoplastic given its very  slow interval growth. Recommend follow-up exam in three months to  ensure stability.  4. Mild cardiomegaly.    NATHAN WILL MD   Cardiac Catheterization    Narrative    1-two-vessel coronary disease.       -Culprit lesion is distal RCA thrombotic occlusion stenting into PDA.       -Also 80 to 90% mid RCA lesion.     -There is a chronic total occlusion of the mid circumflex.    2-successful PCI of the distal RCA with KIM.  Its distal RCA and the   bifurcation into the PDA and BAHMAN dilated 2.5 mm balloon.  Distal RCA into   the PDA then stented with a 2.25 x 38 mm Synergy KIM, postdilated 3.0   noncompliant.  0% residual stenosis and ARIN-3 flow.  Maintained patency   into the posterior lateral branch.    3-successful PCI with IKM of the mid RCA.  3.5 x 16 mm Synergy KIM placed,   postdilated with a 4.0 noncompliant balloon.  0% restenosis and ARIN-3   flow.    4-the mid circumflex occlusion was probed with intuition and  150   guidewires.  This confirmed this was a  as there was no penetration at   all into the lesion with these guidewires.           Most Recent Lab Results In EPIC (For Non-EPIC Providers):    Most Recent 3 CBC's:  Recent Labs   Lab Test 11/22/20  0433 11/21/20  0121 11/20/20  1816   WBC 12.5* 12.0* 13.4*   HGB 14.4 15.0 17.1   MCV 91 90 91    192 224      Most Recent 3 BMP's:  Recent Labs   Lab Test 11/22/20  0433 11/21/20  0121 11/20/20  1816 03/13/14  0800     --  136 135   POTASSIUM 3.8 3.5 3.8 4.4   CHLORIDE 103  --  105 103   CO2 25  --  27 24   BUN 18  --  14 29   CR 1.20  --  1.20 1.26*   ANIONGAP 5  --  4 8   ARMANDO 8.1*  --  8.6 9.5   GLC 90   --  99 111*     Most Recent 3 Troponin's:  Recent Labs   Lab Test 11/21/20  0541 11/21/20  0121 11/20/20  1816   TROPI 26.059* 28.104* 29.680*     Most Recent 3 INR's:  Recent Labs   Lab Test 11/20/20  1816 09/27/13  1000 09/26/13  1835   INR 0.96 0.98 0.93     Most Recent 2 LFT's:  Recent Labs   Lab Test 11/22/20  0433 11/21/20  0121   AST 63* 120*   ALT 28 35   ALKPHOS 100 112   BILITOTAL 1.1 1.0     Most Recent Cholesterol Panel:  Recent Labs   Lab Test 11/22/20  0433   CHOL 171   *   HDL 39*   TRIG 108     Most Recent 6 Bacteria Isolates From Any Culture (See EPIC Reports for Culture Details):  Recent Labs   Lab Test 09/29/13  0725 09/29/13  0715 09/27/13  1259   CULT No growth No growth No anaerobes isolated  Moderate growth Staphylococcus aureus Moderate growth Coagulase negative Staphylococcus Susceptibility testing not  routinely done  Heavy growth Staphylococcus aureus Critical Value, preliminary result only, called to and read back by Fco SMITH RN  @0619 on 9/28/13 by cms. Susceptibility testing done on previous specimen     Most Recent TSH, T4 and HgbA1c:  Recent Labs   Lab Test 11/20/20 1816   A1C 6.2*

## 2020-11-22 NOTE — PROGRESS NOTES
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   General Information   Onset of Illness/Injury or Date of Surgery 11/20/20   Referring Physician Ok Carrillo MD   Patient/Family Therapy Goal Statement (OT) Pt wants to leave today by 4 pm, and wants to return to work asap   Additional Occupational Profile Info/Pertinent History of Current Problem 60 year old male who was admitted on 11/20/2020 with 4 days of chest pain and found to have NSTEMI, now s/p successful PCI of the distal RCA with KIM and successful PCI with KIM of the mid RCA   Existing Precautions/Restrictions   (R wrist angio site)   Cognitive Status Examination   Orientation Status orientation to person, place and time   Affect/Mental Status (Cognitive) WNL   Follows Commands WNL   Pain Assessment   Patient Currently in Pain No   Integumentary/Edema   Integumentary/Edema no deficits were identifed   Posture   Posture not impaired   Range of Motion Comprehensive   General Range of Motion no range of motion deficits identified   Strength Comprehensive (MMT)   Comment, General Manual Muscle Testing (MMT) Assessment Deconditioned   Muscle Tone Assessment   Muscle Tone Quick Adds No deficits were identified   Coordination   Upper Extremity Coordination No deficits were identified   Bed Mobility   Comment (Bed Mobility) (I) supine <>EOB   Transfers   Transfer Comments (I) sit<>stand   Balance   Balance Comments (I) ambulation without AD   Instrumental Activities of Daily Living  (IADL)   Previous Responsibilities meal prep;housekeeping;laundry;shopping;yardwork;medication management;finances;driving;work   Clinical Impression   Criteria for Skilled Therapeutic Interventions Met (OT) yes   OT Diagnosis Deconditioning, MI   OT Problem List-Impairments impacting ADL problems related to;activity tolerance impaired;post-surgical precautions   Assessment of Occupational Performance 1-3 Performance Deficits   Identified Performance Deficits IADL   Planned Therapy Interventions (OT) progressive activity/exercise;risk factor education;home program guidelines   Clinical Decision Making Complexity (OT) low complexity   Therapy Frequency (OT) 2x/day   Predicted Duration of Therapy 3 days   Risks and Benefits of Treatment have been explained. Yes   Patient, Family & other staff in agreement with plan of care Yes   OT Discharge Planning    OT Discharge Recommendation (DC Rec) Home with assist;home with outpatient cardiac rehab   OT Rationale for DC Rec Anticipate that by the time pt is medically stable for discharge, he will be safe for home with assist for heavy chores; he will benefit from OP CR after MI and KIM   OT Brief overview of current status  (I) with ADL; hypertensive and briefly hypoxic with exercise   Total Evaluation Time (Minutes)   Total Evaluation Time (Minutes) 10

## 2020-11-22 NOTE — PLAN OF CARE
VSS, not in pain at time of discharge. Pt states all belongings and paperwork are accounted for. Medications have been filled at in house pharmacy and are with pt at time of discharge except for nitroglycerin which has been escribed to pts chosen pharmacy. Discharge education complete including meds, follow up and all instructions. Post angio instructions gone over including importance of DAPT. No further questions on discharge information. Family here to drive pt home.

## 2020-11-22 NOTE — PRE-PROCEDURE
GENERAL PRE-PROCEDURE:   Procedure:  Heart catheterization with  Date/Time:  11/22/2020 2:25 AM    Written consent obtained?: Yes    Risks and benefits: Risks, benefits and alternatives were discussed    Consent given by:  Patient  Patient states understanding of procedure being performed: Yes    Patient's understanding of procedure matches consent: Yes    Procedure consent matches procedure scheduled: Yes    Expected level of sedation:  Moderate  Appropriately NPO:  Yes  ASA Class:  Class 4- Severe systemic disease, acute unstable problems  Mallampati  :  Grade 3- soft palate visible, posterior pharyngeal wall not visible  Lungs:  Lungs clear with good breath sounds bilaterally  Heart:  Normal heart sounds and rate  History & Physical reviewed:  History and physical reviewed and no updates needed  Statement of review:  I have reviewed the lab findings, diagnostic data, medications, and the plan for sedation

## 2020-11-22 NOTE — PROGRESS NOTES
0149: Per Dr. Ortiz cath lab will be activated. Patient notified and in agrence.   NS stared at 75ml/hr  Consent has been printed    0235: Patient left the floor with flying Callahan RN and nursing assistant . Patient is on cart with monitor.    0430: Patient arrived back to floor. Right TR band-CMS intact, 2 stents to the RCA. nitroglycerin and heparin stopped in cath lab. Plan for an echo in the morning.

## 2020-11-22 NOTE — DISCHARGE SUMMARY
[facility]  Discharge Summary        Sancho Hardin MRN# [id number]   YOB: 1960 Age: 60 year old     Date of Admission:  11/20/2020  Date of Discharge:  11/22/2020  Admitting Physician:  Yara Recio MD  Discharge Physician: Vincenzo Huang MD  Discharging Service: Hospitalist     Primary Provider:  Andrew Quinn  Primary Care Physician Phone Number: [phone]         Discharge Diagnoses/Problem Oriented Hospital Course (Providers):    Sancho Hardin was admitted on 11/20/2020 by Yara Recio MD and I would refer you to their history and physical.  The following problems were addressed during his hospitalization:    Sancho Hardin is a very pleasant 60 year old male with uncontrolled hypertension, morbid obesity, dyslipidemia, tobacco abuse who was admitted on 11/20/2020 with chest pressure, shortness of breath and significantly elevated troponin with EKG changes consistent with NSTEMI.     NSTEMI with mid and distal RCA thrombosis s/p stenting x2,  of mid cx  Poorly controlled hypertension  Cardiomegaly   Symptoms of intermittent chest pressure for the past 4 days, along with increased ankle swelling. Not on medication prior to admission for blood pressure which he knows has been elevated. Brilinta and aspirin given in ED.   -serial troponin downtrending  -treated with heparin infusion and nitroglycerin  -cardiology consultation  - FLP showed , HDL 39, , N  and   - LFTs normal CRP elevated to 81 hemoglobin A1c is 6.2  - underwent emergent LCH and underwent stenting of PDA and mid RCA, has  of mid circ  -aspirin 81 and plavix for 12 months  -continue lisinopril 20 mg daily, carvedilol 12.5 mg BID and lipitor 40 mg  -check ambulator bp readings and bring to cardiology clinic.  - outpatient cardiac rehab  - outpatient sleep study      Morbid obesity  BMI 36     Incidental finding of scattered pulmonary  nodules  These were present in imaging obtained in 2014 and have had minimal increase in size so unlikely malignancy.   -outpatient follow up for repeat imaging in 3 months     Symptomatic Rule Out of COVID-19 infection  This patient was evaluated during a global COVID-19 pandemic, which necessitated consideration that the patient might be at risk for infection with the SARS-CoV-2 virus that causes COVID-19. Applicable protocols for evaluation were followed during the patient's care. Low suspicion for infection given alternate explanation for shortness of breath, chest pressure.   -follow-up COVID-19 PCR test negative         Code Status:      Full Code         Important Results:      NAD  HEENT NORMAL  PULM CTA  CV RRR  GI SOFT +BS  MS NO EDEMA  NEURO NON FOCAL  DERM WARM AND DRY         Pending Results:        Unresulted Labs Ordered in the Past 30 Days of this Admission     No orders found from 10/21/2020 to 11/21/2020.               Discharge Instructions and Follow-Up:      Follow-up Appointments     Follow-up and recommended labs and tests       Follow up with cardiology in 10 days    Follow up with PCP in 2 weeks    Follow up with outpatient sleep study         Follow-up and recommended labs and tests       Repeat CT of chest for follow up pulmonary nodules in 3 months to   establish stability                  Discharge Disposition:      Discharged to home         Discharge Medications:        Current Discharge Medication List      START taking these medications    Details   aspirin (ASA) 81 MG EC tablet Take 1 tablet (81 mg) by mouth daily  Qty: 30 tablet, Refills: 1    Associated Diagnoses: NSTEMI (non-ST elevated myocardial infarction) (H)      atorvastatin (LIPITOR) 40 MG tablet Take 1 tablet (40 mg) by mouth daily  Qty: 30 tablet, Refills: 1    Associated Diagnoses: NSTEMI (non-ST elevated myocardial infarction) (H)      carvedilol (COREG) 12.5 MG tablet Take 1 tablet (12.5 mg) by mouth 2 times daily  (with meals)  Qty: 60 tablet, Refills: 1    Associated Diagnoses: NSTEMI (non-ST elevated myocardial infarction) (H)      clopidogrel (PLAVIX) 75 MG tablet Take 1 tablet (75 mg) by mouth daily  Qty: 30 tablet, Refills: 1    Associated Diagnoses: NSTEMI (non-ST elevated myocardial infarction) (H)      lisinopril (ZESTRIL) 10 MG tablet Take 2 tablets (20 mg) by mouth daily  Qty: 60 tablet, Refills: 1    Associated Diagnoses: NSTEMI (non-ST elevated myocardial infarction) (H)      nitroGLYcerin (NITROSTAT) 0.4 MG sublingual tablet For chest pain place 1 tablet under the tongue every 5 minutes for 3 doses. If symptoms persist 5 minutes after 1st dose call 911.  Qty: 30 tablet, Refills: 1    Associated Diagnoses: NSTEMI (non-ST elevated myocardial infarction) (H)                  Allergies:         Allergies   Allergen Reactions     Oxycodone-Acetaminophen Nausea and Vomiting     Vicodin [Hydrocodone-Acetaminophen]             Consultations This Hospital Stay:      Consultation during this admission received from cardiology          Discharge Orders for Skilled Facility (from Discharge Orders):        After Care Instructions     Activity      Your activity upon discharge: activity as tolerated         Diet      Follow this diet upon discharge: Orders Placed This Encounter      Low Saturated Fat Na <2400 mg                    Rehab orders for Skilled Facility (from Discharge Orders):      Referrals     Future Labs/Procedures    CARDIAC REHAB REFERRAL     Process Instructions:    Advance to Wellness and Exercise for Life (WEL) Program or to another   maintenance exercise program upon completion of phase 2 cardiac rehab.    Weight mgt program is only offered at [facility].    *This therapy referral will be filtered to a centralized scheduling office   at [facility] and the patient will receive a call to   schedule an appointment at a [place] location most convenient for them. *        Comments:    Patient  may choose their preference of the site for Cardiac Rehab.               Discharge Time:       Greater than 30 minutes.        Image Results From This Hospital Stay (For Non-EPIC Providers):        Results for orders placed or performed during the hospital encounter of 11/20/20   CT Chest Pulmonary Embolism w Contrast    Narrative    CT CHEST PULMONARY EMBOLISM WITH CONTRAST  11/20/2020 7:37 PM    HISTORY: PE suspected, intermediate probability, positive D-dimer.  Chest pain. Evaluate PE.    TECHNIQUE: Scans obtained from the apices through the diaphragm with  IV contrast. 80mL Isovue-370 IV injected. Radiation dose for this scan  was reduced using automated exposure control, adjustment of the mA  and/or kV according to patient size, or iterative reconstruction  technique.    COMPARISON: Chest CT on 3/13/2014.    FINDINGS:  Chest/mediastinum: No evidence of pulmonary embolism. Mild  cardiomegaly. No pericardial effusion. Moderate atherosclerotic  vascular calcification of the coronary arteries and thoracic aorta.  Multiple enlarged mediastinal and hilar lymph nodes, increased as  compared to 3/13/2014 exam, for example, 1.5 cm short axis right  pretracheal lymph node (series 5 image 93) and 1.2 cm short axis right  hilar lymph node (series 5 image 118), indeterminate, reactive. The  main pulmonary artery is enlarged measuring 3.6 cm, this can be seen  with pulmonary hypertension.    Lungs and pleura: No pleural effusion or pneumothorax. Mild bibasilar  atelectasis. Scattered pulmonary nodules including 10 mm right lower  lobe nodule (series 5 image 141), slightly increased as compared to  3/13/2014 exam which measures 7 mm and 6 mm left lower lobe nodule  (series 5 image 174), not significantly changed as compared to  3/13/2014 exam.    Upper abdomen: Limited evaluation of the upper abdomen due to lack of  coverage and timing of contrast. Reflux of contrast into the hepatic  IVC, suggesting right heart  dysfunction.    Bones and soft tissue: Multilevel degenerative changes of the spine.  No suspicious osseous lesion.      Impression    IMPRESSION:   1. No evidence of pulmonary embolism.  2. The main pulmonary artery is enlarged measuring 3.1 cm, this can be  seen with pulmonary hypertension. Multiple enlarged mediastinal and  hilar lymph nodes, increased as compared to 3/13/2014 exam,  indeterminate, could be reactive.  3. Scattered pulmonary nodules including 10 mm right lower lobe  nodule, increased in size as compared to 3/13/2014 where it measured 7  mm, although indeterminant, but less likely neoplastic given its very  slow interval growth. Recommend follow-up exam in three months to  ensure stability.  4. Mild cardiomegaly.    NATHAN WILL MD   Cardiac Catheterization    Narrative    1-two-vessel coronary disease.       -Culprit lesion is distal RCA thrombotic occlusion stenting into PDA.       -Also 80 to 90% mid RCA lesion.     -There is a chronic total occlusion of the mid circumflex.    2-successful PCI of the distal RCA with KIM.  Its distal RCA and the   bifurcation into the PDA and BAHMAN dilated 2.5 mm balloon.  Distal RCA into   the PDA then stented with a 2.25 x 38 mm Synergy KIM, postdilated 3.0   noncompliant.  0% residual stenosis and ARIN-3 flow.  Maintained patency   into the posterior lateral branch.    3-successful PCI with KIM of the mid RCA.  3.5 x 16 mm Synergy KIM placed,   postdilated with a 4.0 noncompliant balloon.  0% restenosis and ARIN-3   flow.    4-the mid circumflex occlusion was probed with intuition and  150   guidewires.  This confirmed this was a  as there was no penetration at   all into the lesion with these guidewires.           Most Recent Lab Results In EPIC (For Non-EPIC Providers):    Most Recent 3 CBC's:  Recent Labs   Lab Test 11/22/20  0433 11/21/20  0121 11/20/20  1816   WBC 12.5* 12.0* 13.4*   HGB 14.4 15.0 17.1   MCV 91 90 91    192 224       Most Recent 3 BMP's:  Recent Labs   Lab Test 11/22/20  0433 11/21/20  0121 11/20/20  1816 03/13/14  0800     --  136 135   POTASSIUM 3.8 3.5 3.8 4.4   CHLORIDE 103  --  105 103   CO2 25  --  27 24   BUN 18  --  14 29   CR 1.20  --  1.20 1.26*   ANIONGAP 5  --  4 8   ARMANDO 8.1*  --  8.6 9.5   GLC 90  --  99 111*     Most Recent 3 Troponin's:  Recent Labs   Lab Test 11/21/20  0541 11/21/20  0121 11/20/20 1816   TROPI 26.059* 28.104* 29.680*     Most Recent 3 INR's:  Recent Labs   Lab Test 11/20/20  1816 09/27/13  1000 09/26/13  1835   INR 0.96 0.98 0.93     Most Recent 2 LFT's:  Recent Labs   Lab Test 11/22/20  0433 11/21/20  0121   AST 63* 120*   ALT 28 35   ALKPHOS 100 112   BILITOTAL 1.1 1.0     Most Recent Cholesterol Panel:  Recent Labs   Lab Test 11/22/20  0433   CHOL 171   *   HDL 39*   TRIG 108     Most Recent 6 Bacteria Isolates From Any Culture (See EPIC Reports for Culture Details):  Recent Labs   Lab Test 09/29/13  0725 09/29/13  0715 09/27/13  1259   CULT No growth No growth No anaerobes isolated  Moderate growth Staphylococcus aureus Moderate growth Coagulase negative Staphylococcus Susceptibility testing not  routinely done  Heavy growth Staphylococcus aureus Critical Value, preliminary result only, called to and read back by Fco SMITH RN  @[id number] on 9/28/13 by cms. Susceptibility testing done on previous specimen     Most Recent TSH, T4 and HgbA1c:  Recent Labs   Lab Test 11/20/20 1816   A1C 6.2*

## 2020-11-22 NOTE — PROGRESS NOTES
[facility]    Cardiology Progress Note     Assessment & Plan   Sancho Hardin is a 60 year old male who was admitted on 11/20/2020. I was asked to see the patient for a non-ST elevation myocardial infarction.  The patient had a delayed presentation and has had chest pain for 4 days.  He was hypertensive and in the emergency room after receiving nitroglycerin his blood pressure improved and his chest pain resolved.      The patient was medically managed yesterday until last night where he developed recurrent chest pain. He was stable throughout the day. Despite aggressive measures with morphine and nitroglycerin the patient had refractory pain in the cardiac Cath Lab was activated. The patient received two drug-eluting stents in his right coronary system. The patient has a  of his circumflex. Patient also has residual disease in the LAD. This morning he feels without pain and well.     #1 NSTEMI  #2 Hypertension  #3 obesity     Plan:  Continue aspirin, statin,   The patient does not have insurance and will need to be transitioned to Plavix. Patient would like to be dismissed at 4 PM. I will load him with 600 mg of Plavix at 3 PM. This is not ideal from a pharmacokinetic standpoint. However this is a compromise as the patient wants to leave the hospital. He will need to be on 75 mg of Plavix thereafter for 1 year.  Obtain echocardiogram  continue carvedilol (I have increased his dose)  Continue lisinopril        I advised that the patient remain in the hospital overnight tonight. The patient has family concerns at home and would like to be discharged this evening. While this is not ideal, if the patient remains chest pain-free and the TR band removal process goes well the patient would like to be discharged. I discussed the risk of discharging and the patient understands the risk.    We will arrange for the patient to follow-up within 1 week of dismissal.    Sean Ortiz,  MD    Interval History   Patient had refractory chest pain last night that developed suddenly that was not responsive to medical therapy. He underwent drug-eluting stent to the right coronary system.    Physical Exam   Temp: 98.8  F (37.1  C) Temp src: Axillary BP: (!) 161/95 Pulse: 86   Resp: 26 SpO2: (!) 89 % O2 Device: None (Room air) Oxygen Delivery: 3 LPM  Vitals:    11/20/20 1733 11/22/20 0545   Weight: 117.9 kg (260 lb) 116.8 kg (257 lb 8 oz)     Vital Signs with Ranges  Temp:  [97.9  F (36.6  C)-99.1  F (37.3  C)] 98.8  F (37.1  C)  Pulse:  [72-91] 86  Resp:  [9-35] 26  BP: ()/(56-97) 161/95  SpO2:  [87 %-100 %] 89 %  I/O last 3 completed shifts:  In: 100 [P.O.:100]  Out: -     GENERAL APPEARANCE: Alert and oriented x3. No acute distress.  SKIN: Inspection of the skin reveals no rashes, ulcerations, warm, dry  NECK: Supple and symmetric.  Normal JVP  LUNGS: Clear breath sounds throughout bilaterally, no wheezes, no rhonchi  CARDIOVASCULAR: S1, S2, regular rate and rhythm without any murmurs, gallops, rubs. The carotid pulses were normal and 2+ bilaterally without bruits. Peripheral pulses were 2+ and symmetric.  ABDOMEN: Soft, non-tender, non-distended with normal bowel sounds.  No ascites noted.  EXTREMITIES: No edema. Radial access site has TR band in place with no hematoma  NEUROLOGIC:  Normal mood and affect.  Sensation to touch was normal.    Medications     - MEDICATION INSTRUCTIONS -       Continuing ACE inhibitor/ARB/ARNI from home medication list OR ACE inhibitor/ARB order already placed during this visit       - MEDICATION INSTRUCTIONS -       - MEDICATION INSTRUCTIONS -       - MEDICATION INSTRUCTIONS -       - MEDICATION INSTRUCTIONS -       Percutaneous Coronary Intervention orders placed (this is information for BPA alerting)         [START ON 11/23/2020] aspirin  81 mg Oral Daily     atorvastatin  40 mg Oral Daily     carvedilol  6.25 mg Oral BID     influenza recomb quadrivalent PF  0.5  mL Intramuscular Prior to discharge     lisinopril  10 mg Oral Daily     polyethylene glycol  17 g Oral Daily     senna-docusate  1 tablet Oral BID    Or     senna-docusate  2 tablet Oral BID     sodium chloride (PF)  3 mL Intracatheter Q8H     ticagrelor  90 mg Oral BID       Data   Results for orders placed or performed during the hospital encounter of 11/20/20 (from the past 24 hour(s))   EKG 12-lead, tracing only   Result Value Ref Range    Interpretation ECG Click View Image link to view waveform and result    EKG 12-lead, tracing only   Result Value Ref Range    Interpretation ECG Click View Image link to view waveform and result    Activated clotting time POCT   Result Value Ref Range    Activated Clot Time 310 (H) 75 - 150 sec   Activated clotting time POCT   Result Value Ref Range    Activated Clot Time 209 (H) 75 - 150 sec   Cardiac Catheterization    Narrative    1-two-vessel coronary disease.       -Culprit lesion is distal RCA thrombotic occlusion stenting into PDA.       -Also 80 to 90% mid RCA lesion.     -There is a chronic total occlusion of the mid circumflex.    2-successful PCI of the distal RCA with KIM.  Its distal RCA and the   bifurcation into the PDA and BAHMAN dilated 2.5 mm balloon.  Distal RCA into   the PDA then stented with a 2.25 x 38 mm Synergy KIM, postdilated 3.0   noncompliant.  0% residual stenosis and ARIN-3 flow.  Maintained patency   into the posterior lateral branch.    3-successful PCI with KIM of the mid RCA.  3.5 x 16 mm Synergy KIM placed,   postdilated with a 4.0 noncompliant balloon.  0% restenosis and ARIN-3   flow.    4-the mid circumflex occlusion was probed with intuition and  150   guidewires.  This confirmed this was a  as there was no penetration at   all into the lesion with these guidewires.   EKG 12-lead, tracing only    Result Value Ref Range    Interpretation ECG Click View Image link to view waveform and result    Lipid panel reflex to direct LDL    Result Value Ref Range    Cholesterol 171 <200 mg/dL    Triglycerides 108 <150 mg/dL    HDL Cholesterol 39 (L) >39 mg/dL    LDL Cholesterol Calculated 110 (H) <100 mg/dL    Non HDL Cholesterol 132 (H) <130 mg/dL   Hepatic panel   Result Value Ref Range    Bilirubin Direct 0.4 (H) 0.0 - 0.2 mg/dL    Bilirubin Total 1.1 0.2 - 1.3 mg/dL    Albumin 2.8 (L) 3.4 - 5.0 g/dL    Protein Total 6.7 (L) 6.8 - 8.8 g/dL    Alkaline Phosphatase 100 40 - 150 U/L    ALT 28 0 - 70 U/L    AST 63 (H) 0 - 45 U/L   Basic metabolic panel   Result Value Ref Range    Sodium 133 133 - 144 mmol/L    Potassium 3.8 3.4 - 5.3 mmol/L    Chloride 103 94 - 109 mmol/L    Carbon Dioxide 25 20 - 32 mmol/L    Anion Gap 5 3 - 14 mmol/L    Glucose 90 70 - 99 mg/dL    Urea Nitrogen 18 7 - 30 mg/dL    Creatinine 1.20 0.66 - 1.25 mg/dL    GFR Estimate 64 >60 mL/min/[1.73_m2]    GFR Estimate If Black 75 >60 mL/min/[1.73_m2]    Calcium 8.1 (L) 8.5 - 10.1 mg/dL   CBC with platelets   Result Value Ref Range    WBC 12.5 (H) 4.0 - 11.0 10e9/L    RBC Count 4.68 4.4 - 5.9 10e12/L    Hemoglobin 14.4 13.3 - 17.7 g/dL    Hematocrit 42.4 40.0 - 53.0 %    MCV 91 78 - 100 fl    MCH 30.8 26.5 - 33.0 pg    MCHC 34.0 31.5 - 36.5 g/dL    RDW 13.9 10.0 - 15.0 %    Platelet Count 206 150 - 450 10e9/L

## 2020-11-22 NOTE — PLAN OF CARE
Neuro:intact  CV/Rhythm:SR, HTN   Resp/02:RA  GI/Diet:obese  :voiding  Skin/Incisions/Sites:intact, R TR band site CDI, bruising  Pulses/CMS:intact  Edema:none  Activity/Falls Risk:independent  Lines/Drains/IVs:PIV dc'd   Labs/BGM:-  Test/Procedures:angio over night  VS/Pain:HTN, pain free  DC Plan:today after 4pm  Other:changed to plavix, plavix load, echo done CR seen

## 2020-11-22 NOTE — PROGRESS NOTES
2140: Patient complains of chest pain-EKG released  -No changes noted on EKG, PRN morphine given.

## 2020-11-22 NOTE — PROGRESS NOTES
[facility] brief note:    While present on unit, was asked by nursing to evaluate pt for ongoing chest pain despite nitroglycerin infusion, 5 mg IV morphine, BB, heparin infusion.  Pt reports apical chest pain, describes as sharp, squeezing in nature.  Position change not help relieve any pain.  Pt states his pain remains 6/10 despite nitroglycerin or morphine; notes his pain has not changed with any attempted interventions.  Pt's most recent EKG with TWI leads I, aVL, V5-V6, slight ST elevation lead III, Q wave noted lead III, aVF.  Pt's HR 80s, SBP 110s.  Considered IV dilaudid; however, pt reports concern for possible nausea.  Requested for nursing to page cardiology for next steps in pt's plan of care as pt require cath lab tonight.    CORRINE Guerra, CNP  [facility]    No charge.

## 2020-11-22 NOTE — PROVIDER NOTIFICATION
MD Notification    Notified Person: MD    Notified Person Name: Dr. Ortiz    Notification Date/Time: 11/21 4782    Notification Interaction: Page    Purpose of Notification: Patient complains of 8/10 chest pain. PRN morphine given with no relief. nitroglycerin drip titrated-now at 90mcg    Orders Received: Additional 3 mg morphine ordered, call cardiology back if pain is not relieved    0030: Dr. Ortiz notified of unrelieved chest pain- he will discuss further steps with Dr. Geiger

## 2020-11-23 NOTE — PLAN OF CARE
Occupational Therapy Discharge Summary    Reason for therapy discharge:    Discharged to home with outpatient therapy.    Progress towards therapy goal(s). See goals on Care Plan in [facility] electronic health record for goal details.  Goals partially met.  Barriers to achieving goals:   discharge on same date as initial evaluation.    Therapy recommendation(s):    Continued therapy is recommended.  Rationale/Recommendations:  OP Phase 2 CR.

## 2020-11-24 LAB
INTERPRETATION ECG - MUSE: NORMAL

## 2022-08-24 ENCOUNTER — LAB (OUTPATIENT)
Dept: LAB | Facility: CLINIC | Age: 62
End: 2022-08-24
Payer: COMMERCIAL

## 2022-08-24 ENCOUNTER — OFFICE VISIT (OUTPATIENT)
Dept: CARDIOLOGY | Facility: CLINIC | Age: 62
End: 2022-08-24
Payer: COMMERCIAL

## 2022-08-24 VITALS
BODY MASS INDEX: 36.4 KG/M2 | OXYGEN SATURATION: 97 % | HEART RATE: 65 BPM | SYSTOLIC BLOOD PRESSURE: 130 MMHG | HEIGHT: 71 IN | DIASTOLIC BLOOD PRESSURE: 89 MMHG | WEIGHT: 260 LBS

## 2022-08-24 DIAGNOSIS — I25.10 CORONARY ARTERY DISEASE INVOLVING NATIVE CORONARY ARTERY OF NATIVE HEART WITHOUT ANGINA PECTORIS: ICD-10-CM

## 2022-08-24 DIAGNOSIS — I25.10 CORONARY ARTERY DISEASE INVOLVING NATIVE CORONARY ARTERY OF NATIVE HEART WITHOUT ANGINA PECTORIS: Primary | ICD-10-CM

## 2022-08-24 LAB
ANION GAP SERPL CALCULATED.3IONS-SCNC: 4 MMOL/L (ref 3–14)
BUN SERPL-MCNC: 24 MG/DL (ref 7–30)
CALCIUM SERPL-MCNC: 9.4 MG/DL (ref 8.5–10.1)
CHLORIDE BLD-SCNC: 107 MMOL/L (ref 94–109)
CO2 SERPL-SCNC: 26 MMOL/L (ref 20–32)
CREAT SERPL-MCNC: 1.72 MG/DL (ref 0.66–1.25)
GFR SERPL CREATININE-BSD FRML MDRD: 44 ML/MIN/1.73M2
GLUCOSE BLD-MCNC: 111 MG/DL (ref 70–99)
POTASSIUM BLD-SCNC: 4.5 MMOL/L (ref 3.4–5.3)
SODIUM SERPL-SCNC: 137 MMOL/L (ref 133–144)

## 2022-08-24 PROCEDURE — 99204 OFFICE O/P NEW MOD 45 MIN: CPT | Performed by: INTERNAL MEDICINE

## 2022-08-24 PROCEDURE — 36415 COLL VENOUS BLD VENIPUNCTURE: CPT | Performed by: INTERNAL MEDICINE

## 2022-08-24 PROCEDURE — 80048 BASIC METABOLIC PNL TOTAL CA: CPT | Performed by: INTERNAL MEDICINE

## 2022-08-24 RX ORDER — CARVEDILOL 12.5 MG/1
12.5 TABLET ORAL 2 TIMES DAILY WITH MEALS
COMMUNITY
End: 2024-04-06

## 2022-08-24 RX ORDER — LISINOPRIL 20 MG/1
20 TABLET ORAL DAILY
COMMUNITY
End: 2024-04-06

## 2022-08-24 RX ORDER — DAPAGLIFLOZIN 5 MG/1
5 TABLET, FILM COATED ORAL DAILY
COMMUNITY
End: 2024-04-06

## 2022-08-24 RX ORDER — ASPIRIN 81 MG/1
81 TABLET, CHEWABLE ORAL DAILY
COMMUNITY
End: 2024-04-06

## 2022-08-24 RX ORDER — ATORVASTATIN CALCIUM 40 MG/1
40 TABLET, FILM COATED ORAL DAILY
COMMUNITY
End: 2024-04-06

## 2022-08-24 RX ORDER — FUROSEMIDE 40 MG
40 TABLET ORAL DAILY
COMMUNITY
End: 2024-04-06

## 2022-08-24 RX ORDER — ACETAMINOPHEN 325 MG/1
325-650 TABLET ORAL PRN
COMMUNITY

## 2022-08-24 NOTE — LETTER
8/24/2022    CELIO BENAVIDEZ  [facility] [address]  [place] [address]    RE: Sancho Hardin       Dear Colleague,     I had the pleasure of seeing Sancho Hardin in the [facility].    Cardiology Consultation     Assessment & Plan     1.  Non-STEMI with delayed presentation with chest pain for days preceding his presentation.  Underwent PCI to culprit RCA with drug-eluting stents, November 2020  2.   of circumflex, managed medically, remainder moderate nonocclusive disease  3.  Hypertension  4.  Elevated BMI  5.  Ischemic cardiomyopathy, ejection fraction estimated to be 40 to 45% in 2020 study with lateral inferolateral akinesis  6.  Per chart review issues with compliance with medications due to financial barriers  7.  Chronic renal insufficiency      Recommendations      1.  Atherosclerotic coronary artery disease: Two-vessel coronary artery disease identified on angiogram 2020.  PCI to his culprit RCA was performed, this was a delayed presentation.  He has residual  of his circumflex which is being managed medically.  No active chest pain complaints.  Let us continue with his aspirin and Plavix for the time being given his vascular burden.  We may discontinue his Plavix in the near future when he meet him again.  Otherwise continue with his Coreg, lisinopril and statin therapy    2.  Ischemic cardiomyopathy: Patient had issues with compliance due to financial barriers which have since now resolved.  He is able to now afford his medications.  We will recommend that he continue with his carvedilol, lisinopril, and maintenance diuretic therapy.  Presently is euvolemic.  Follow-up echocardiogram has been ordered for next year.    3.  Hyperlipidemia: Continue with statin therapy will check lipids next year    4.  Thank you kindly for consult we look forward to following up with him next year.        Omari Hernandez MD,  MD          HPI:      Patient is a 62-year-old gentleman here to reestablish cardiac care.  Review of chart demonstrates that he had presented with a non-STEMI in 2020.  At that time he underwent PCI of the RCA with my colleague Dr. Carrillo who has since retired.  He has a diagnosis of ischemic cardiomyopathy ejection fraction of 40 to 45% with inferolateral akinesis on testing at that time.  No further testing has been performed since that time.  He has been lost to follow-up and it appears that his barriers appear to be financial in nature initially which has since resolved.  He was recently admitted to [facility] with heart failure exacerbation.  He was found to be severely hypertensive he had been off of all of his medications due to financial concerns.  Troponin was minimally elevated however he had not been complaining of any chest pain.  He was medically stabilized and given intravenous Lasix and his outpatient regimen was slowly reintroduced.  With this he has gone back to his normal level of functioning.  He is here to establish locally.          Primary Care Physician   CELIO BENAVIDEZ      Patient Active Problem List   Diagnosis     Infected finger     NSTEMI (non-ST elevated myocardial infarction) (H)       Past Medical History   I have reviewed this patient's medical history and updated it with pertinent information if needed.   History reviewed. No pertinent past medical history.    Past Surgical History   I have reviewed this patient's surgical history and updated it with pertinent information if needed.  Past Surgical History:   Procedure Laterality Date     AMPUTATE FINGER(S)  9/27/2013    Procedure: AMPUTATE FINGER(S);  IRRIGATION AND DEBRIDEMENT,excisional biopsy of infection RIGHT LONG FINGER.;  Surgeon: Demond Woodward MD;  Location: [facility] OR     CV CORONARY ANGIOGRAM N/A 11/22/2020    Procedure: Heart Catheterization with Possible Intervention;  Surgeon: Ok Carrillo MD;   "Location:  HEART CARDIAC CATH LAB     CV PCI STENT DRUG ELUTING N/A 11/22/2020    Procedure: Percutaneous Coronary Intervention Stent Drug Eluting;  Surgeon: Ok Carrillo MD;  Location:  HEART CARDIAC CATH LAB     ORTHOPEDIC SURGERY  09/04/2013    right hand 3rd digit pin placement       Prior to Admission Medications   Cannot display prior to admission medications because the patient has not been admitted in this contact.     [unfilled]  [unfilled]  Allergies   Allergies   Allergen Reactions     Oxycodone-Acetaminophen Nausea and Vomiting     Vicodin [Hydrocodone-Acetaminophen]        Social History    reports that he has been smoking. He has never used smokeless tobacco. He reports that he does not drink alcohol and does not use drugs.    Family History   History reviewed. No pertinent family history.    Review of Systems   The comprehensive 10 point Review of Systems is negative other than noted in the HPI or here.     Physical Exam   Vital Signs with Ranges     Wt Readings from Last 4 Encounters:   08/24/22 117.9 kg (260 lb)   11/22/20 116.8 kg (257 lb 8 oz)   03/13/14 122.5 kg (270 lb)   09/26/13 102.1 kg (225 lb)     [unfilled]      Vitals: Ht 1.803 m (5' 11\")   Wt 117.9 kg (260 lb)   BMI 36.26 kg/m      GENERAL: Healthy, alert and no distress  EYES: Eyes grossly normal to inspection.  No discharge or erythema, or obvious scleral/conjunctival abnormalities.  RESP: No audible wheeze, cough, or visible cyanosis.  No visible retractions or increased work of breathing.    SKIN: Visible skin clear. No significant rash, abnormal pigmentation or lesions.  NEURO: Cranial nerves grossly intact.  Mentation and speech appropriate for age.  PSYCH: Mentation appears normal, affect normal/bright, judgement and insight intact, normal speech and appearance well-groomed.      Thank you for allowing me to participate in the care of your patient.      Sincerely,     Omari Hernandez MD     M " [facility]  cc:   Korin Monreal NP  [facility] HEART AND [facility]  [address] [phone]

## 2022-08-24 NOTE — PROGRESS NOTES
Cardiology Consultation     Assessment & Plan     1.  Non-STEMI with delayed presentation with chest pain for days preceding his presentation.  Underwent PCI to culprit RCA with drug-eluting stents, November 2020  2.   of circumflex, managed medically, remainder moderate nonocclusive disease  3.  Hypertension  4.  Elevated BMI  5.  Ischemic cardiomyopathy, ejection fraction estimated to be 40 to 45% in 2020 study with lateral inferolateral akinesis  6.  Per chart review issues with compliance with medications due to financial barriers  7.  Chronic renal insufficiency      Recommendations      1.  Atherosclerotic coronary artery disease: Two-vessel coronary artery disease identified on angiogram 2020.  PCI to his culprit RCA was performed, this was a delayed presentation.  He has residual  of his circumflex which is being managed medically.  No active chest pain complaints.  Let us continue with his aspirin and Plavix for the time being given his vascular burden.  We may discontinue his Plavix in the near future when he meet him again.  Otherwise continue with his Coreg, lisinopril and statin therapy    2.  Ischemic cardiomyopathy: Patient had issues with compliance due to financial barriers which have since now resolved.  He is able to now afford his medications.  We will recommend that he continue with his carvedilol, lisinopril, and maintenance diuretic therapy.  Presently is euvolemic.  Follow-up echocardiogram has been ordered for next year.    3.  Hyperlipidemia: Continue with statin therapy will check lipids next year    4.  Thank you kindly for consult we look forward to following up with him next year.        Omari Hernandez MD, MD          HPI:      Patient is a 62-year-old gentleman here to reestablish cardiac care.  Review of chart demonstrates that he had presented with a non-STEMI in 2020.  At that time he underwent PCI of the RCA with my colleague Dr. Carrillo who has since retired.  He  has a diagnosis of ischemic cardiomyopathy ejection fraction of 40 to 45% with inferolateral akinesis on testing at that time.  No further testing has been performed since that time.  He has been lost to follow-up and it appears that his barriers appear to be financial in nature initially which has since resolved.  He was recently admitted to [facility] with heart failure exacerbation.  He was found to be severely hypertensive he had been off of all of his medications due to financial concerns.  Troponin was minimally elevated however he had not been complaining of any chest pain.  He was medically stabilized and given intravenous Lasix and his outpatient regimen was slowly reintroduced.  With this he has gone back to his normal level of functioning.  He is here to establish locally.          Primary Care Physician   CELIO BENAVIDEZ      Patient Active Problem List   Diagnosis     Infected finger     NSTEMI (non-ST elevated myocardial infarction) (H)       Past Medical History   I have reviewed this patient's medical history and updated it with pertinent information if needed.   History reviewed. No pertinent past medical history.    Past Surgical History   I have reviewed this patient's surgical history and updated it with pertinent information if needed.  Past Surgical History:   Procedure Laterality Date     AMPUTATE FINGER(S)  9/27/2013    Procedure: AMPUTATE FINGER(S);  IRRIGATION AND DEBRIDEMENT,excisional biopsy of infection RIGHT LONG FINGER.;  Surgeon: Demond Woodward MD;  Location:  OR     CV CORONARY ANGIOGRAM N/A 11/22/2020    Procedure: Heart Catheterization with Possible Intervention;  Surgeon: Ok Carrillo MD;  Location:  HEART CARDIAC CATH LAB     CV PCI STENT DRUG ELUTING N/A 11/22/2020    Procedure: Percutaneous Coronary Intervention Stent Drug Eluting;  Surgeon: Ok Carrillo MD;  Location:  HEART CARDIAC CATH LAB     ORTHOPEDIC SURGERY  09/04/2013    right hand  "3rd digit pin placement       Prior to Admission Medications   Cannot display prior to admission medications because the patient has not been admitted in this contact.     [unfilled]  [unfilled]  Allergies   Allergies   Allergen Reactions     Oxycodone-Acetaminophen Nausea and Vomiting     Vicodin [Hydrocodone-Acetaminophen]        Social History    reports that he has been smoking. He has never used smokeless tobacco. He reports that he does not drink alcohol and does not use drugs.    Family History   History reviewed. No pertinent family history.    Review of Systems   The comprehensive 10 point Review of Systems is negative other than noted in the HPI or here.     Physical Exam   Vital Signs with Ranges     Wt Readings from Last 4 Encounters:   08/24/22 117.9 kg (260 lb)   11/22/20 116.8 kg (257 lb 8 oz)   03/13/14 122.5 kg (270 lb)   09/26/13 102.1 kg (225 lb)     [unfilled]      Vitals: Ht 1.803 m (5' 11\")   Wt 117.9 kg (260 lb)   BMI 36.26 kg/m      GENERAL: Healthy, alert and no distress  EYES: Eyes grossly normal to inspection.  No discharge or erythema, or obvious scleral/conjunctival abnormalities.  RESP: No audible wheeze, cough, or visible cyanosis.  No visible retractions or increased work of breathing.    SKIN: Visible skin clear. No significant rash, abnormal pigmentation or lesions.  NEURO: Cranial nerves grossly intact.  Mentation and speech appropriate for age.  PSYCH: Mentation appears normal, affect normal/bright, judgement and insight intact, normal speech and appearance well-groomed.  "

## 2022-08-29 ENCOUNTER — TELEPHONE (OUTPATIENT)
Dept: CARDIOLOGY | Facility: CLINIC | Age: 62
End: 2022-08-29

## 2022-08-29 DIAGNOSIS — I25.10 CORONARY ARTERY DISEASE INVOLVING NATIVE CORONARY ARTERY OF NATIVE HEART WITHOUT ANGINA PECTORIS: Primary | ICD-10-CM

## 2022-08-29 NOTE — TELEPHONE ENCOUNTER
BMP done after visit on 8/24/22 with Dr. Hernandez:     Component      Latest Ref Rng & Units 8/24/2022   Sodium      133 - 144 mmol/L 137   Potassium      3.4 - 5.3 mmol/L 4.5   Chloride      94 - 109 mmol/L 107   Carbon Dioxide      20 - 32 mmol/L 26   Anion Gap      3 - 14 mmol/L 4   Urea Nitrogen      7 - 30 mg/dL 24   Creatinine      0.66 - 1.25 mg/dL 1.72 (H)   Calcium      8.5 - 10.1 mg/dL 9.4   Glucose      70 - 99 mg/dL 111 (H)   GFR Estimate      >60 mL/min/1.73m2 44 (L)     Pt's most recent BMP in Care Everywhere was on 11/13/21, at that time creatinine was 1.55.     Pt was seen to establish care, per Dr. Hernandez's note:     1.  Non-STEMI with delayed presentation with chest pain for days preceding his presentation.  Underwent PCI to culprit RCA with drug-eluting stents, November 2020  2.   of circumflex, managed medically, remainder moderate nonocclusive disease  3.  Hypertension  4.  Elevated BMI  5.  Ischemic cardiomyopathy, ejection fraction estimated to be 40 to 45% in 2020 study with lateral inferolateral akinesis  6.  Per chart review issues with compliance with medications due to financial barriers  7.  Chronic renal insufficiency     Recommendations     1.  Atherosclerotic coronary artery disease: Two-vessel coronary artery disease identified on angiogram 2020.  PCI to his culprit RCA was performed, this was a delayed presentation.  He has residual  of his circumflex which is being managed medically.  No active chest pain complaints.  Let us continue with his aspirin and Plavix for the time being given his vascular burden.  We may discontinue his Plavix in the near future when he meet him again.  Otherwise continue with his Coreg, lisinopril and statin therapy     2.  Ischemic cardiomyopathy: Patient had issues with compliance due to financial barriers which have since now resolved.  He is able to now afford his medications.  We will recommend that he continue with his carvedilol, lisinopril,  and maintenance diuretic therapy.  Presently is euvolemic.  Follow-up echocardiogram has been ordered for next year.     3.  Hyperlipidemia: Continue with statin therapy will check lipids next year     4.  Thank you kindly for consult we look forward to following up with him next year.      Routing to Dr. Hernandez to review.

## 2022-09-01 NOTE — TELEPHONE ENCOUNTER
Labs reviewed by Dr. Hernandez, he is recommending pt to hold lisinopril and recheck BMP in 2 weeks. Called pt, reviewed recommendations. Pt verbalized understanding and agreement with plan. Order placed for BMP. Pt to call and schedule lab appointment, provided phone number.

## 2023-01-24 NOTE — Clinical Note
1  Other secondary acute gout of right ankle  methylPREDNISolone 4 MG tablet therapy pack    colchicine (COLCRYS) 0 6 mg tablet    Basic metabolic panel      2  Chronic pain of right ankle  methylPREDNISolone 4 MG tablet therapy pack    colchicine (COLCRYS) 0 6 mg tablet    Basic metabolic panel      3  Right ankle effusion  methylPREDNISolone 4 MG tablet therapy pack    colchicine (COLCRYS) 0 6 mg tablet    Basic metabolic panel        Orders Placed This Encounter   Procedures   • Basic metabolic panel        Imaging Studies (I personally reviewed images in PACS and report):    • X-ray right knee 09/28/2022:  No acute osseous abnormalities  No significant degenerative changes  No joint effusion  • X-ray right ankle 09/20/2022:  No acute osseous abnormalities  Mortise intact  Calcaneal in enthesophye at the insertion of Achilles tendon  IMPRESSION:  · Acute on chronic right ankle pain  · No precipitating injury  · Ongoing issue 10+ years  · Aspiration of right ankle from last visit confirming gout arthritis  · Some improvement of symptoms since last visit with colchicine as patient can tolerate weightbearing on RLE    Other factors:  • BMI 40    PLAN:    • Clinical exam and radiographic imaging reviewed with patient today, with impression as per above  • Discussed the findings of his joint arthrocentesis as noted above  • Given he still has continued symptoms of pain and stiffness of the ankle, I prescribed him a Medrol Dosepak today  • I have also prescribed him colchicine 0 6 mg p o  daily which I recommended he start in approximately 2 weeks  I counseled this dosage of colchicine is more for preventative flareups of pain  I recommended that he discuss with his PCP as well to discuss whether or not to continue colchicine for the long-term or alternatively try other medications for gout prevention  • I will order a baseline BMP of his blood work as well    Counseled to discuss with his PCP about Balloon inserted to right coronary artery and middle right coronary artery. further blood work as he may need a uric acid testing in approximately 2 weeks after his current flare as well as regular checks for uric acid levels  • Dietary changes were discussed as well in order to prevent recurrent flareups of pain  • Several forms of FMLA documentation and disability from his flareups were done today    Return in 22 days (on 2/15/2023)  I have spent 60 minutes with Patient  today in which greater than 50% of this time was spent in counseling/coordination of care regarding Diagnostic results, Prognosis, Risks and benefits of tx options, Intructions for management, Patient and family education, Importance of tx compliance, Risk factor reductions, Impressions and FMLA documentation completion  Portions of the record may have been created with voice recognition software  Occasional wrong word or "sound a like" substitutions may have occurred due to the inherent limitations of voice recognition software  Read the chart carefully and recognize, using context, where substitutions have occurred  CHIEF COMPLAINT:  Chief Complaint   Patient presents with   • Right Ankle - Follow-up         HPI:  Isatu Acosta is a 37 y o  male  who presents for     Visit 1/24/2023: Follow-up right ankle pain/swelling  Aspiration was obtained of his right ankle since last visit which was found to be positive for mono urate crystals confirming his gout diagnosis  He states improvement of his symptoms since last visit after completion of colchicine  He states it only progressively improved over time and did take a few days after the completion of colchicine  He states he does not 100% resolved but he is able to tolerate weightbearing on his right lower extremity with mild to moderate discomfort  He does feel like he can return back to work tomorrow but is concerned about recurrent flareups in the future and is here today with FMLA forms to be completed    Patient reports paperwork needs to be completed in the next 3 days or a risks losing his job  Reports stiffness of his ankle with range of motion movements  Reports continued swelling in slight erythema of his ankle, less than before  Denies F/C  Visit 1/17/2023: Follow up right ankle pain:  Patient reportedly had an acute flareup of right ankle pain, swelling, erythema over the past 2 days  Denies any precipitating injury  Pain has become so severe that he cannot tolerate weightbearing on his right lower extremity and is here today with his ankle wrapped in Ace wrap and using the clinic wheelchair  Pain is throughout his ankle and is aggravated with any range of motion movement of his ankle, light touch, weightbearing  Denies F/C/N/V  He has been using ice/heat therapy/Tylenol/Motrin all which only provided minimal relief  Medical, Surgical, Family, and Social History    History reviewed  No pertinent past medical history  History reviewed  No pertinent surgical history  Social History   Social History     Substance and Sexual Activity   Alcohol Use Yes    Comment: rare     Social History     Substance and Sexual Activity   Drug Use Never     Social History     Tobacco Use   Smoking Status Never   Smokeless Tobacco Never     Family History   Problem Relation Age of Onset   • Hypertension Mother    • Diabetes Father    • Diabetes type II Father      No Known Allergies       Physical Exam  BP (!) 154/103   Pulse 94   Ht 5' 6" (1 676 m)   Wt 112 kg (248 lb)   BMI 40 03 kg/m²     Constitutional:  see vital signs  Gen: obese, normocephalic/atraumatic, well-groomed  Pulmonary/Chest: Effort normal  No respiratory distress         Ortho Exam     Ankle Examination (focused):     Gait: Patient is able to transition from sitting to standing and he is able to walk with slight antalgia      RIGHT   Inspection Erythema +across ankle     Edema 1+    Ecchymosis none        ROM:  Plantarflexion 10    Dorsiflexion 10        Strength Pronation 5-/5    Supination 5-/5    Foot plantarflexion 5-/5    Foot dorsflexion U5-/5        TTP  +tibiotalar/fibulotalar joint line       No calf tenderness    LE NV Exam: +2 DP/PT pulses           Procedures

## 2024-04-06 ENCOUNTER — APPOINTMENT (OUTPATIENT)
Dept: GENERAL RADIOLOGY | Facility: CLINIC | Age: 64
End: 2024-04-06
Attending: EMERGENCY MEDICINE

## 2024-04-06 ENCOUNTER — APPOINTMENT (OUTPATIENT)
Dept: CT IMAGING | Facility: CLINIC | Age: 64
End: 2024-04-06
Attending: EMERGENCY MEDICINE

## 2024-04-06 ENCOUNTER — HOSPITAL ENCOUNTER (EMERGENCY)
Facility: CLINIC | Age: 64
Discharge: HOME | End: 2024-04-06
Attending: EMERGENCY MEDICINE | Admitting: EMERGENCY MEDICINE

## 2024-04-06 VITALS
BODY MASS INDEX: 37.8 KG/M2 | DIASTOLIC BLOOD PRESSURE: 118 MMHG | SYSTOLIC BLOOD PRESSURE: 163 MMHG | OXYGEN SATURATION: 97 % | WEIGHT: 270 LBS | HEIGHT: 71 IN | TEMPERATURE: 97.6 F | RESPIRATION RATE: 12 BRPM | HEART RATE: 85 BPM

## 2024-04-06 DIAGNOSIS — R59.0 HILAR LYMPHADENOPATHY: ICD-10-CM

## 2024-04-06 DIAGNOSIS — I50.9 ACUTE ON CHRONIC CONGESTIVE HEART FAILURE, UNSPECIFIED HEART FAILURE TYPE (H): ICD-10-CM

## 2024-04-06 DIAGNOSIS — J90 BILATERAL PLEURAL EFFUSION: ICD-10-CM

## 2024-04-06 DIAGNOSIS — I21.4 NSTEMI (NON-ST ELEVATED MYOCARDIAL INFARCTION) (H): ICD-10-CM

## 2024-04-06 LAB
ALBUMIN SERPL BCG-MCNC: 3.6 G/DL (ref 3.5–5.2)
ALP SERPL-CCNC: 127 U/L (ref 40–150)
ALT SERPL W P-5'-P-CCNC: 26 U/L (ref 0–70)
ANION GAP SERPL CALCULATED.3IONS-SCNC: 11 MMOL/L (ref 7–15)
AST SERPL W P-5'-P-CCNC: 13 U/L (ref 0–45)
ATRIAL RATE - MUSE: 94 BPM
BASOPHILS # BLD AUTO: 0 10E3/UL (ref 0–0.2)
BASOPHILS NFR BLD AUTO: 0 %
BILIRUB DIRECT SERPL-MCNC: <0.2 MG/DL (ref 0–0.3)
BILIRUB SERPL-MCNC: 0.8 MG/DL
BUN SERPL-MCNC: 19.8 MG/DL (ref 8–23)
CALCIUM SERPL-MCNC: 9 MG/DL (ref 8.8–10.2)
CHLORIDE SERPL-SCNC: 106 MMOL/L (ref 98–107)
CREAT SERPL-MCNC: 1.32 MG/DL (ref 0.67–1.17)
D DIMER PPP FEU-MCNC: 2.55 UG/ML FEU (ref 0–0.5)
DEPRECATED HCO3 PLAS-SCNC: 22 MMOL/L (ref 22–29)
DIASTOLIC BLOOD PRESSURE - MUSE: NORMAL MMHG
EGFRCR SERPLBLD CKD-EPI 2021: 60 ML/MIN/1.73M2
EOSINOPHIL # BLD AUTO: 0.1 10E3/UL (ref 0–0.7)
EOSINOPHIL NFR BLD AUTO: 1 %
ERYTHROCYTE [DISTWIDTH] IN BLOOD BY AUTOMATED COUNT: 14.5 % (ref 10–15)
GLUCOSE SERPL-MCNC: 117 MG/DL (ref 70–99)
HCT VFR BLD AUTO: 46.2 % (ref 40–53)
HGB BLD-MCNC: 15.5 G/DL (ref 13.3–17.7)
IMM GRANULOCYTES # BLD: 0 10E3/UL
IMM GRANULOCYTES NFR BLD: 0 %
INTERPRETATION ECG - MUSE: NORMAL
LYMPHOCYTES # BLD AUTO: 1.9 10E3/UL (ref 0.8–5.3)
LYMPHOCYTES NFR BLD AUTO: 20 %
MCH RBC QN AUTO: 29.6 PG (ref 26.5–33)
MCHC RBC AUTO-ENTMCNC: 33.5 G/DL (ref 31.5–36.5)
MCV RBC AUTO: 88 FL (ref 78–100)
MONOCYTES # BLD AUTO: 0.8 10E3/UL (ref 0–1.3)
MONOCYTES NFR BLD AUTO: 8 %
NEUTROPHILS # BLD AUTO: 6.9 10E3/UL (ref 1.6–8.3)
NEUTROPHILS NFR BLD AUTO: 71 %
NRBC # BLD AUTO: 0 10E3/UL
NRBC BLD AUTO-RTO: 0 /100
NT-PROBNP SERPL-MCNC: 1919 PG/ML (ref 0–900)
P AXIS - MUSE: 35 DEGREES
PLATELET # BLD AUTO: 244 10E3/UL (ref 150–450)
POTASSIUM SERPL-SCNC: 4.2 MMOL/L (ref 3.4–5.3)
PR INTERVAL - MUSE: 174 MS
PROT SERPL-MCNC: 6.7 G/DL (ref 6.4–8.3)
QRS DURATION - MUSE: 110 MS
QT - MUSE: 390 MS
QTC - MUSE: 487 MS
R AXIS - MUSE: 34 DEGREES
RBC # BLD AUTO: 5.24 10E6/UL (ref 4.4–5.9)
SODIUM SERPL-SCNC: 139 MMOL/L (ref 135–145)
SYSTOLIC BLOOD PRESSURE - MUSE: NORMAL MMHG
T AXIS - MUSE: 146 DEGREES
TROPONIN T SERPL HS-MCNC: 38 NG/L
VENTRICULAR RATE- MUSE: 94 BPM
WBC # BLD AUTO: 9.8 10E3/UL (ref 4–11)

## 2024-04-06 PROCEDURE — 80048 BASIC METABOLIC PNL TOTAL CA: CPT | Performed by: EMERGENCY MEDICINE

## 2024-04-06 PROCEDURE — 96374 THER/PROPH/DIAG INJ IV PUSH: CPT | Mod: 59

## 2024-04-06 PROCEDURE — 71046 X-RAY EXAM CHEST 2 VIEWS: CPT

## 2024-04-06 PROCEDURE — 85379 FIBRIN DEGRADATION QUANT: CPT | Performed by: EMERGENCY MEDICINE

## 2024-04-06 PROCEDURE — 71275 CT ANGIOGRAPHY CHEST: CPT

## 2024-04-06 PROCEDURE — 99285 EMERGENCY DEPT VISIT HI MDM: CPT | Mod: 25

## 2024-04-06 PROCEDURE — 84484 ASSAY OF TROPONIN QUANT: CPT | Performed by: EMERGENCY MEDICINE

## 2024-04-06 PROCEDURE — 85025 COMPLETE CBC W/AUTO DIFF WBC: CPT | Performed by: EMERGENCY MEDICINE

## 2024-04-06 PROCEDURE — 250N000011 HC RX IP 250 OP 636: Performed by: EMERGENCY MEDICINE

## 2024-04-06 PROCEDURE — 36415 COLL VENOUS BLD VENIPUNCTURE: CPT | Performed by: EMERGENCY MEDICINE

## 2024-04-06 PROCEDURE — 250N000009 HC RX 250: Performed by: EMERGENCY MEDICINE

## 2024-04-06 PROCEDURE — 93005 ELECTROCARDIOGRAM TRACING: CPT

## 2024-04-06 PROCEDURE — 82248 BILIRUBIN DIRECT: CPT | Performed by: EMERGENCY MEDICINE

## 2024-04-06 PROCEDURE — 83880 ASSAY OF NATRIURETIC PEPTIDE: CPT | Performed by: EMERGENCY MEDICINE

## 2024-04-06 RX ORDER — ASPIRIN 81 MG/1
81 TABLET, CHEWABLE ORAL DAILY
Qty: 30 TABLET | Refills: 0 | Status: SHIPPED | OUTPATIENT
Start: 2024-04-06 | End: 2024-05-06

## 2024-04-06 RX ORDER — FUROSEMIDE 10 MG/ML
60 INJECTION INTRAMUSCULAR; INTRAVENOUS ONCE
Status: COMPLETED | OUTPATIENT
Start: 2024-04-06 | End: 2024-04-06

## 2024-04-06 RX ORDER — IOPAMIDOL 755 MG/ML
82 INJECTION, SOLUTION INTRAVASCULAR ONCE
Status: COMPLETED | OUTPATIENT
Start: 2024-04-06 | End: 2024-04-06

## 2024-04-06 RX ORDER — LISINOPRIL 20 MG/1
20 TABLET ORAL DAILY
Qty: 30 TABLET | Refills: 0 | Status: SHIPPED | OUTPATIENT
Start: 2024-04-06 | End: 2024-05-06

## 2024-04-06 RX ORDER — FUROSEMIDE 40 MG
40 TABLET ORAL DAILY
Qty: 30 TABLET | Refills: 0 | Status: SHIPPED | OUTPATIENT
Start: 2024-04-06 | End: 2024-05-06

## 2024-04-06 RX ORDER — ATORVASTATIN CALCIUM 40 MG/1
40 TABLET, FILM COATED ORAL DAILY
Qty: 30 TABLET | Refills: 0 | Status: SHIPPED | OUTPATIENT
Start: 2024-04-06 | End: 2024-05-06

## 2024-04-06 RX ORDER — CARVEDILOL 12.5 MG/1
12.5 TABLET ORAL 2 TIMES DAILY WITH MEALS
Qty: 60 TABLET | Refills: 0 | Status: SHIPPED | OUTPATIENT
Start: 2024-04-06 | End: 2024-05-06

## 2024-04-06 RX ORDER — DAPAGLIFLOZIN 5 MG/1
5 TABLET, FILM COATED ORAL DAILY
Qty: 30 TABLET | Refills: 0 | Status: SHIPPED | OUTPATIENT
Start: 2024-04-06 | End: 2024-05-06

## 2024-04-06 RX ADMIN — FUROSEMIDE 60 MG: 10 INJECTION, SOLUTION INTRAMUSCULAR; INTRAVENOUS at 13:03

## 2024-04-06 RX ADMIN — SODIUM CHLORIDE 100 ML: 9 INJECTION, SOLUTION INTRAVENOUS at 13:50

## 2024-04-06 RX ADMIN — IOPAMIDOL 82 ML: 755 INJECTION, SOLUTION INTRAVENOUS at 13:50

## 2024-04-06 ASSESSMENT — ACTIVITIES OF DAILY LIVING (ADL)
ADLS_ACUITY_SCORE: 36

## 2024-04-06 ASSESSMENT — COLUMBIA-SUICIDE SEVERITY RATING SCALE - C-SSRS
1. IN THE PAST MONTH, HAVE YOU WISHED YOU WERE DEAD OR WISHED YOU COULD GO TO SLEEP AND NOT WAKE UP?: NO
2. HAVE YOU ACTUALLY HAD ANY THOUGHTS OF KILLING YOURSELF IN THE PAST MONTH?: NO
6. HAVE YOU EVER DONE ANYTHING, STARTED TO DO ANYTHING, OR PREPARED TO DO ANYTHING TO END YOUR LIFE?: NO

## 2024-04-06 NOTE — ED TRIAGE NOTES
Pt having hard time breathing and having bilateral lower extremity swelling for 1 week. Diagnosed CHF  last year, was on diuretics yet stopped taking them for last couple months.

## 2024-04-06 NOTE — ED PROVIDER NOTES
"    History     Chief Complaint:  Shortness of Breath       [facility]   Sancho Hardin is a 63 year old male who presents to the ED for an evaluation of shortness of breath. The patient reports that his symptoms started this past week after helping his brother move. He complains of swelling to both of his legs, shortness of breath and intermittent chest pressure. He states that his shortness of breath is more notable when he is exerting himself and when he is sleeping and has had a lack of sleep due to it. He also endorses having a mild cough. He states that he was diagnosed with CHF but notes that he ran out of his medications about a month and a half ago.  He denies any alcohol use but notes that he continues to smoke. He denies any anticoagulant medication.    Independent Historian:    Patient, as per HPI.     Review of External Notes:  I reviewed his nurse triage notes from 04/05/2024    I have reviewed  his echo cardiograph report from 11/22/2020 and his cardiac catherization  from 11/22/2020    Medications:    Asprin   Lipitor   Coreg   Plavix   Farxiga   Lasix   Zestril   Nitrostat    Past Medical History:    No past medical history on file.    Past Surgical History:    Amputate fingers   CV coronary angiogram   CV PCI stent drug eluting   Orthopedic surgery     Physical Exam   Patient Vitals for the past 24 hrs:   BP Temp Temp src Pulse Resp SpO2 Height Weight   04/06/24 1343 (!) 163/118 -- -- 85 12 97 % -- --   04/06/24 1332 (!) 152/129 -- -- 87 12 94 % -- --   04/06/24 1315 -- -- -- 87 10 96 % -- --   04/06/24 1300 -- -- -- 91 18 92 % -- --   04/06/24 1245 -- -- -- 93 14 94 % -- --   04/06/24 1230 -- -- -- -- -- 96 % -- --   04/06/24 1221 -- 97.6  F (36.4  C) Temporal -- -- -- -- --   04/06/24 1218 (!) 195/112 -- -- 97 18 96 % 1.803 m (5' 11\") 122.5 kg (270 lb)        Physical Exam  Nursing note and vitals reviewed.  Constitutional:  Oriented to person, place, and time. Cooperative.   HENT:   Nose:    Nose " normal.   Mouth/Throat:   Mucous membranes are normal.   Eyes:    Conjunctivae normal and EOM are normal.      Pupils are equal, round, and reactive to light.   Neck:    Trachea normal.   Cardiovascular:  Normal rate, regular rhythm, normal heart sounds and normal pulses. No murmur heard.  Pulmonary/Chest:  Effort normal.  Diminished breath sounds in the bases with some faint rales present but no expiratory wheezes present.   Abdominal:   Soft. Normal appearance and bowel sounds are normal.      There is no tenderness.      There is no rebound and no CVA tenderness.   Musculoskeletal:  Bilateral lower extremity edema present.  Extremities atraumatic x 4.   Lymphadenopathy:  No cervical adenopathy.   Neurological:   Alert and oriented to person, place, and time. Normal strength.      No cranial nerve deficit or sensory deficit. GCS eye subscore is 4. GCS verbal subscore is 5. GCS motor subscore is 6.   Skin:    Skin is intact. No rash noted.   Psychiatric:   Normal mood and affect.      Emergency Department Course   ECG  ECG taken at 1206, ECG read at 1240  Sinus rhythm with premature atrial complexes   ST &T wave abnormality, consider lateral ischemia   Prolonged QT   Abnormal ECG   When compared with prior ECG, dated 11/22/20, No significant change.   Rate 94 bpm. OK interval 174 ms. QRS duration 110 ms. QT/QTc 390/487 ms. P-R-T axes 35 34 146.    Imaging:  CT Chest Pulmonary Embolism w Contrast   Final Result   IMPRESSION:   1.  No pulmonary embolus.   2.  Cardiomegaly and pulmonary interstitial edema. Findings suggestive of elevated right heart pressures.   3.  Small right and trace left pleural effusions.   4.  Interval increase in the mild mediastinal and hilar adenopathy is nonspecific. Follow-up in 3 months is recommended at minimum to assess for change. PET/CT and a nonemergent outpatient basis could also be performed.   5.  Similar scattered pulmonary nodules measuring up to 10 x 9 mm. These are not  slightly changed since 2020 and statistically benign. No specific follow-up required.      XR Chest 2 Views   Final Result   IMPRESSION: Cardiac silhouette at the upper limits of normal in size. Findings suggestive of heart failure, including pulmonary vascular congestion with likely mild interstitial edema. Small bilateral pleural effusions with associated compressive    atelectasis. No pneumothorax. No acute bony abnormality. Healed left rib fractures          Laboratory:  Labs Ordered and Resulted from Time of ED Arrival to Time of ED Departure   BASIC METABOLIC PANEL - Abnormal       Result Value    Sodium 139      Potassium 4.2      Chloride 106      Carbon Dioxide (CO2) 22      Anion Gap 11      Urea Nitrogen 19.8      Creatinine 1.32 (*)     GFR Estimate 60 (*)     Calcium 9.0      Glucose 117 (*)    TROPONIN T, HIGH SENSITIVITY - Abnormal    Troponin T, High Sensitivity 38 (*)    NT PROBNP INPATIENT - Abnormal    N terminal Pro BNP Inpatient 1,919 (*)    D DIMER QUANTITATIVE - Abnormal    D-Dimer Quantitative 2.55 (*)    HEPATIC FUNCTION PANEL - Normal    Protein Total 6.7      Albumin 3.6      Bilirubin Total 0.8      Alkaline Phosphatase 127      AST 13      ALT 26      Bilirubin Direct <0.20     CBC WITH PLATELETS AND DIFFERENTIAL    WBC Count 9.8      RBC Count 5.24      Hemoglobin 15.5      Hematocrit 46.2      MCV 88      MCH 29.6      MCHC 33.5      RDW 14.5      Platelet Count 244      % Neutrophils 71      % Lymphocytes 20      % Monocytes 8      % Eosinophils 1      % Basophils 0      % Immature Granulocytes 0      NRBCs per 100 WBC 0      Absolute Neutrophils 6.9      Absolute Lymphocytes 1.9      Absolute Monocytes 0.8      Absolute Eosinophils 0.1      Absolute Basophils 0.0      Absolute Immature Granulocytes 0.0      Absolute NRBCs 0.0          Emergency Department Course & Assessments:    Interventions:  Medications   furosemide (LASIX) injection 60 mg (60 mg Intravenous $Given 4/6/24 7248)    Saline (100 mLs As instructed $Given 4/6/24 1350)   iopamidol (ISOVUE-370) solution 82 mL (82 mLs Intravenous $Given 4/6/24 1350)        Independent Interpretation (X-rays, CTs, rhythm strip):  I independently reviewed the patient's chest x-ray and agree with the reading of fluid overload.    Assessments/Consultations/Discussion of Management or Tests:  ED Course as of 04/06/24 1527   Sat Apr 06, 2024   1242 I have evaluated the patient         Social Determinants of Health affecting care:  Healthcare Access/Compliance     Disposition:  The patient was discharged.    Impression & Plan    CT for PE was ordered because the patient had an abnormal d-dimer.    Medical Decision Making:  This is a 63-year-old male with history of CHF and prior NSTEMI and stent placement who came in for further evaluation of shortness of breath and bilateral lower extremity edema.  Based on his presentation, I was quite suspicious that his symptoms would be secondary to a CHF exacerbation and fluid overload.  However I also was concerned about the possibility of a new NSTEMI or possibly pleural effusions and pulmonary embolism.  He also was provided an IV dose of Lasix here.  When his D-dimer came back elevated, I then proceeded with a CT scan of his chest as well, which was negative for PE, but it does show small bilateral pleural effusions and CHF.  He also does have slightly enlarged hilar lymph nodes, which I informed him of and the need for follow-up.  I discussed staying in the hospital and indicated that was likely the best course of action for him, especially since I am not able to fully rule out another NSTEMI.  However he refuses to stay and prefers to go home.  He understands there is risk involved in going home, and he is accepting of that risk.  I agreed to provide him prescriptions for his medications.  He knows to return with any concerns or worsening symptoms or if he changes his mind.  I also stressed the importance of  close outpatient follow-up with cardiology and primary care.  I also placed an order in [facility] for a cardiology follow-up appointment.      Diagnosis:    ICD-10-CM    1. Acute on chronic congestive heart failure, unspecified heart failure type (H)  I50.9 Follow-Up with Cardiology      2. Bilateral pleural effusion  J90 Follow-Up with Cardiology      3. Hilar lymphadenopathy  R59.0                     Discharge Medications:  Discharge Medication List as of 4/6/2024  2:49 PM             Scribe Disclosure:  I, Petty Mejia, am serving as a scribe at 1:43 PM on 4/6/2024 to document services personally performed by Fabrizio Herman MD based on my observations and the provider's statements to me.  4/6/2024   Fabrizio Herman MD Lashkowitz, Seth H, MD  04/06/24 1531       Fabrizio Herman MD  04/06/24 1531

## (undated) DEVICE — CATH DIAGNOSTIC RADIAL 5FR TIG 4.0

## (undated) DEVICE — CATH BALLOON NC EMERGE 3.00X12MM H7493926712300

## (undated) DEVICE — CATH ANGIO JUDKINS JL3.5 6FRX100CM INFINITI 534618T

## (undated) DEVICE — CATH BALLOON NC EMERGE 4.00X12MM H7493926712400

## (undated) DEVICE — DEFIB PRO-PADZ LVP LQD GEL ADULT 8900-2105-01

## (undated) DEVICE — CATH BALLOON EMERGE 2.5X12MM H7493918912250

## (undated) DEVICE — .014IN X 180CM INTUITION HYDRO-TRACK GUIDEWIRE, STRAIGHT

## (undated) DEVICE — CATH LAUNCHER 6FR AR 2.0 LA6AR20

## (undated) DEVICE — RAD G/W INQWIRE .035X260CM J-TIP EXCHANGE IQ35F260J1O5RS

## (undated) DEVICE — CATH LAUNCHER 6FR LA6EBU375

## (undated) DEVICE — KIT HAND CONTROL ANGIOTOUCH ACIST 65CM AT-P65

## (undated) DEVICE — MANIFOLD KIT ANGIO AUTOMATED 014613

## (undated) DEVICE — INTRO GLIDESHEATH SLENDER 6FR 10X45CM 60-1060

## (undated) DEVICE — GUIDEWIRE VASC 0.014INX190CM J TIP 1010481-HJ

## (undated) DEVICE — SLEEVE TR BAND RADIAL COMPRESSION DEVICE 29CM XX-RF06L

## (undated) DEVICE — TOTE ANGIO CORP PC15AT SAN32CC83O

## (undated) DEVICE — INFL DVC KIT W/10CC NITRO IN4530

## (undated) RX ORDER — FENTANYL CITRATE 50 UG/ML
INJECTION, SOLUTION INTRAMUSCULAR; INTRAVENOUS
Status: DISPENSED
Start: 2020-11-22

## (undated) RX ORDER — HEPARIN SODIUM 1000 [USP'U]/ML
INJECTION, SOLUTION INTRAVENOUS; SUBCUTANEOUS
Status: DISPENSED
Start: 2020-11-22

## (undated) RX ORDER — HEPARIN SODIUM 200 [USP'U]/100ML
INJECTION, SOLUTION INTRAVENOUS
Status: DISPENSED
Start: 2020-11-22

## (undated) RX ORDER — LIDOCAINE HYDROCHLORIDE 10 MG/ML
INJECTION, SOLUTION EPIDURAL; INFILTRATION; INTRACAUDAL; PERINEURAL
Status: DISPENSED
Start: 2020-11-22